# Patient Record
Sex: MALE | Race: WHITE | NOT HISPANIC OR LATINO | Employment: OTHER | URBAN - NONMETROPOLITAN AREA
[De-identification: names, ages, dates, MRNs, and addresses within clinical notes are randomized per-mention and may not be internally consistent; named-entity substitution may affect disease eponyms.]

---

## 2021-04-08 DIAGNOSIS — Z23 ENCOUNTER FOR IMMUNIZATION: ICD-10-CM

## 2023-05-09 ENCOUNTER — HOSPITAL ENCOUNTER (OUTPATIENT)
Dept: NON INVASIVE DIAGNOSTICS | Facility: HOSPITAL | Age: 78
Discharge: HOME | End: 2023-05-09

## 2023-05-09 ENCOUNTER — HOSPITAL ENCOUNTER (OUTPATIENT)
Dept: NON INVASIVE DIAGNOSTICS | Facility: HOSPITAL | Age: 78
Discharge: HOME | End: 2023-05-09
Attending: SURGERY

## 2023-05-09 DIAGNOSIS — I71.40 ABDOMINAL AORTIC ANEURYSM (AAA) WITHOUT RUPTURE (HCC): ICD-10-CM

## 2023-05-09 DIAGNOSIS — I65.23 CAROTID STENOSIS, ASYMPTOMATIC, BILATERAL: ICD-10-CM

## 2023-09-11 ENCOUNTER — OFFICE VISIT (OUTPATIENT)
Dept: URGENT CARE | Facility: CLINIC | Age: 78
End: 2023-09-11
Payer: COMMERCIAL

## 2023-09-11 VITALS
RESPIRATION RATE: 22 BRPM | TEMPERATURE: 97.6 F | OXYGEN SATURATION: 94 % | DIASTOLIC BLOOD PRESSURE: 62 MMHG | SYSTOLIC BLOOD PRESSURE: 132 MMHG | HEART RATE: 98 BPM | WEIGHT: 204 LBS

## 2023-09-11 DIAGNOSIS — M54.41 ACUTE RIGHT-SIDED LOW BACK PAIN WITH RIGHT-SIDED SCIATICA: Primary | ICD-10-CM

## 2023-09-11 PROCEDURE — 99213 OFFICE O/P EST LOW 20 MIN: CPT

## 2023-09-11 PROCEDURE — S9088 SERVICES PROVIDED IN URGENT: HCPCS

## 2023-09-11 RX ORDER — GABAPENTIN 100 MG/1
100 CAPSULE ORAL 2 TIMES DAILY
COMMUNITY
Start: 2023-06-19

## 2023-09-11 RX ORDER — AMLODIPINE BESYLATE 5 MG/1
5 TABLET ORAL DAILY
COMMUNITY
Start: 2023-06-25

## 2023-09-11 RX ORDER — ERGOCALCIFEROL 1.25 MG/1
CAPSULE ORAL
COMMUNITY
Start: 2023-06-25

## 2023-09-11 RX ORDER — ASPIRIN 81 MG/1
81 TABLET, CHEWABLE ORAL DAILY
COMMUNITY

## 2023-09-11 RX ORDER — TAMSULOSIN HYDROCHLORIDE 0.4 MG/1
0.4 CAPSULE ORAL DAILY
COMMUNITY
Start: 2023-06-20

## 2023-09-11 RX ORDER — PRAVASTATIN SODIUM 40 MG
TABLET ORAL
COMMUNITY
Start: 2023-08-29

## 2023-09-11 NOTE — PROGRESS NOTES
[facility]        NAME: Genie Fuentes is a 70 y.o. male  : 1945    MRN: [id number]  DATE: 2023  TIME: 6:43 PM    Assessment and Plan   Acute right-sided low back pain with right-sided sciatica [M54.41]  1. Acute right-sided low back pain with right-sided sciatica          Based on patient's medical history, will defer muscle relaxants and steroids at this time. Will start with conservative management at this time and if symptoms do not improve follow up with PCP or if symptoms worsen go to the ER. Upon arrival his SpO2 was 76% but he was asymptomatic and did not feel SOB. He stated he is on oxygen 2L at nighttime but not during the day. After being placed on 2L O2 in the office, he went up to 94% and was stable. Patient Instructions     Can apply Voltaren gel as needed  Take Tylenol for pain  Alternate heat and ice as needed  Follow up with PCP in 3-5 days. Proceed to  ER if symptoms worsen. Chief Complaint     Chief Complaint   Patient presents with   • Back Pain     C/o lower back pain; denies any injury/trauma         History of Present Illness       Back Pain  This is a new problem. Episode onset: 3 weeks. The pain is present in the lumbar spine (right sided but goes across back). Radiates to: right leg. Associated symptoms include weakness. Pertinent negatives include no bladder incontinence, bowel incontinence, chest pain, numbness, paresthesias or tingling. He has tried nothing for the symptoms. he denies any previous back problems/history. Review of Systems   Review of Systems   Respiratory: Negative for chest tightness and shortness of breath. Cardiovascular: Negative for chest pain and palpitations. Gastrointestinal: Negative for bowel incontinence. Genitourinary: Negative. Negative for bladder incontinence. Musculoskeletal: Positive for back pain and gait problem. Negative for neck pain and neck stiffness.    Neurological: Positive for weakness. Negative for tingling, numbness and paresthesias. Current Medications       Current Outpatient Medications:   •  amLODIPine (NORVASC) 5 mg tablet, Take 5 mg by mouth daily, Disp: , Rfl:   •  aspirin 81 mg chewable tablet, Chew 81 mg daily, Disp: , Rfl:   •  ergocalciferol (VITAMIN D2) 50,000 units, TAKE ONE TABLET BY MOUTH ONE TIME A MONTH, Disp: , Rfl:   •  gabapentin (NEURONTIN) 100 mg capsule, Take 100 mg by mouth 2 (two) times a day, Disp: , Rfl:   •  metFORMIN (GLUCOPHAGE) 500 mg tablet, Take 500 mg by mouth daily, Disp: , Rfl:   •  pravastatin (PRAVACHOL) 40 mg tablet, , Disp: , Rfl:   •  tamsulosin (FLOMAX) 0.4 mg, Take 0.4 mg by mouth daily, Disp: , Rfl:     Current Allergies     Allergies as of 09/11/2023   • (No Known Allergies)            The following portions of the patient's history were reviewed and updated as appropriate: allergies, current medications, past family history, past medical history, past social history, past surgical history and problem list.     Past Medical History:   Diagnosis Date   • COPD (chronic obstructive pulmonary disease) ([address])    • Diabetes mellitus ([address])    • Hyperlipidemia    • Hypertension        Past Surgical History:   Procedure Laterality Date   • CHOLECYSTECTOMY     • CORONARY ARTERY BYPASS GRAFT     • HERNIA REPAIR         History reviewed. No pertinent family history. Medications have been verified. Objective   /62   Pulse 98   Temp 97.6 °F (36.4 °C)   Resp 22   Wt 92.5 kg (204 lb)   SpO2 94% Comment: 2 L NC; denies SOB, no s/s of respiratory distress noted at this time       Physical Exam     Physical Exam  Constitutional:       Appearance: Normal appearance. HENT:      Mouth/Throat:      Mouth: Mucous membranes are moist.      Pharynx: Oropharynx is clear. Cardiovascular:      Rate and Rhythm: Normal rate and regular rhythm. Pulses: Normal pulses. Heart sounds: Normal heart sounds.    Pulmonary:      Effort: Pulmonary effort is normal.      Breath sounds: Normal breath sounds. Musculoskeletal:         General: Tenderness (right lumbar paraspinal) present. No swelling or deformity. Normal range of motion. Skin:     General: Skin is warm. Neurological:      General: No focal deficit present. Mental Status: He is alert and oriented to person, place, and time. Mental status is at baseline.

## 2023-09-11 NOTE — PATIENT INSTRUCTIONS
Can apply Voltaren gel as needed  Take Tylenol for pain  Alternate heat and ice as needed  Follow up with PCP in 3-5 days. Proceed to  ER if symptoms worsen.

## 2023-10-20 ENCOUNTER — HOSPITAL ENCOUNTER (EMERGENCY)
Facility: HOSPITAL | Age: 78
Discharge: HOME | End: 2023-10-20
Attending: EMERGENCY MEDICINE
Payer: COMMERCIAL

## 2023-10-20 ENCOUNTER — APPOINTMENT (EMERGENCY)
Dept: CT IMAGING | Facility: HOSPITAL | Age: 78
End: 2023-10-20
Payer: COMMERCIAL

## 2023-10-20 ENCOUNTER — OFFICE VISIT (OUTPATIENT)
Dept: URGENT CARE | Facility: CLINIC | Age: 78
End: 2023-10-20
Payer: COMMERCIAL

## 2023-10-20 VITALS
HEIGHT: 67 IN | HEART RATE: 99 BPM | DIASTOLIC BLOOD PRESSURE: 69 MMHG | SYSTOLIC BLOOD PRESSURE: 147 MMHG | WEIGHT: 205 LBS | TEMPERATURE: 98.6 F | BODY MASS INDEX: 32.18 KG/M2 | RESPIRATION RATE: 16 BRPM | OXYGEN SATURATION: 95 %

## 2023-10-20 VITALS
SYSTOLIC BLOOD PRESSURE: 112 MMHG | OXYGEN SATURATION: 91 % | RESPIRATION RATE: 18 BRPM | DIASTOLIC BLOOD PRESSURE: 68 MMHG | HEART RATE: 94 BPM | TEMPERATURE: 98.2 F

## 2023-10-20 DIAGNOSIS — N30.00 ACUTE CYSTITIS WITHOUT HEMATURIA: Primary | ICD-10-CM

## 2023-10-20 DIAGNOSIS — N39.0 URINARY TRACT INFECTION: Primary | ICD-10-CM

## 2023-10-20 LAB
ALBUMIN SERPL BCP-MCNC: 4 G/DL (ref 3.5–5)
ALP SERPL-CCNC: 59 U/L (ref 34–104)
ALT SERPL W P-5'-P-CCNC: 7 U/L (ref 7–52)
ANION GAP SERPL CALCULATED.3IONS-SCNC: 7 MMOL/L
AST SERPL W P-5'-P-CCNC: 10 U/L (ref 13–39)
BACTERIA UR QL AUTO: ABNORMAL /HPF
BASOPHILS # BLD AUTO: 0.03 THOUSANDS/ÂΜL (ref 0–0.1)
BASOPHILS NFR BLD AUTO: 0 % (ref 0–1)
BILIRUB SERPL-MCNC: 0.47 MG/DL (ref 0.2–1)
BILIRUB UR QL STRIP: ABNORMAL
BUN SERPL-MCNC: 20 MG/DL (ref 5–25)
CALCIUM SERPL-MCNC: 9 MG/DL (ref 8.4–10.2)
CHLORIDE SERPL-SCNC: 98 MMOL/L (ref 96–108)
CLARITY UR: ABNORMAL
CO2 SERPL-SCNC: 28 MMOL/L (ref 21–32)
COLOR UR: YELLOW
CREAT SERPL-MCNC: 1.05 MG/DL (ref 0.6–1.3)
EOSINOPHIL # BLD AUTO: 0.12 THOUSAND/ÂΜL (ref 0–0.61)
EOSINOPHIL NFR BLD AUTO: 1 % (ref 0–6)
ERYTHROCYTE [DISTWIDTH] IN BLOOD BY AUTOMATED COUNT: 13.4 % (ref 11.6–15.1)
GFR SERPL CREATININE-BSD FRML MDRD: 68 ML/MIN/1.73SQ M
GLUCOSE SERPL-MCNC: 136 MG/DL (ref 65–140)
GLUCOSE UR STRIP-MCNC: NEGATIVE MG/DL
HCT VFR BLD AUTO: 42 % (ref 36.5–49.3)
HGB BLD-MCNC: 13.1 G/DL (ref 12–17)
HGB UR QL STRIP.AUTO: ABNORMAL
IMM GRANULOCYTES # BLD AUTO: 0.11 THOUSAND/UL (ref 0–0.2)
IMM GRANULOCYTES NFR BLD AUTO: 1 % (ref 0–2)
KETONES UR STRIP-MCNC: ABNORMAL MG/DL
LEUKOCYTE ESTERASE UR QL STRIP: ABNORMAL
LYMPHOCYTES # BLD AUTO: 1.51 THOUSANDS/ÂΜL (ref 0.6–4.47)
LYMPHOCYTES NFR BLD AUTO: 8 % (ref 14–44)
MCH RBC QN AUTO: 29.4 PG (ref 26.8–34.3)
MCHC RBC AUTO-ENTMCNC: 31.2 G/DL (ref 31.4–37.4)
MCV RBC AUTO: 94 FL (ref 82–98)
MONOCYTES # BLD AUTO: 1.51 THOUSAND/ÂΜL (ref 0.17–1.22)
MONOCYTES NFR BLD AUTO: 8 % (ref 4–12)
NEUTROPHILS # BLD AUTO: 16.88 THOUSANDS/ÂΜL (ref 1.85–7.62)
NEUTS SEG NFR BLD AUTO: 82 % (ref 43–75)
NITRITE UR QL STRIP: POSITIVE
NON-SQ EPI CELLS URNS QL MICRO: ABNORMAL /HPF
NRBC BLD AUTO-RTO: 0 /100 WBCS
PH UR STRIP.AUTO: 7.5 [PH]
PLATELET # BLD AUTO: 205 THOUSANDS/UL (ref 149–390)
PMV BLD AUTO: 9.8 FL (ref 8.9–12.7)
POTASSIUM SERPL-SCNC: 3.7 MMOL/L (ref 3.5–5.3)
PROT SERPL-MCNC: 7.5 G/DL (ref 6.4–8.4)
PROT UR STRIP-MCNC: ABNORMAL MG/DL
RBC # BLD AUTO: 4.45 MILLION/UL (ref 3.88–5.62)
RBC #/AREA URNS AUTO: ABNORMAL /HPF
SL AMB  POCT GLUCOSE, UA: NEGATIVE
SL AMB LEUKOCYTE ESTERASE,UA: ABNORMAL
SL AMB POCT BILIRUBIN,UA: NEGATIVE
SL AMB POCT BLOOD,UA: ABNORMAL
SL AMB POCT CLARITY,UA: ABNORMAL
SL AMB POCT COLOR,UA: YELLOW
SL AMB POCT KETONES,UA: NEGATIVE
SL AMB POCT NITRITE,UA: POSITIVE
SL AMB POCT PH,UA: 7.5
SL AMB POCT SPECIFIC GRAVITY,UA: 1.01
SL AMB POCT URINE PROTEIN: ABNORMAL
SL AMB POCT UROBILINOGEN: ABNORMAL
SODIUM SERPL-SCNC: 133 MMOL/L (ref 135–147)
SP GR UR STRIP.AUTO: 1.02 (ref 1–1.03)
UROBILINOGEN UR QL STRIP.AUTO: 4 E.U./DL
WBC # BLD AUTO: 20.16 THOUSAND/UL (ref 4.31–10.16)
WBC #/AREA URNS AUTO: ABNORMAL /HPF

## 2023-10-20 PROCEDURE — 81002 URINALYSIS NONAUTO W/O SCOPE: CPT

## 2023-10-20 PROCEDURE — G1004 CDSM NDSC: HCPCS

## 2023-10-20 PROCEDURE — 81001 URINALYSIS AUTO W/SCOPE: CPT | Performed by: EMERGENCY MEDICINE

## 2023-10-20 PROCEDURE — 36415 COLL VENOUS BLD VENIPUNCTURE: CPT | Performed by: EMERGENCY MEDICINE

## 2023-10-20 PROCEDURE — 74176 CT ABD & PELVIS W/O CONTRAST: CPT

## 2023-10-20 PROCEDURE — S9088 SERVICES PROVIDED IN URGENT: HCPCS

## 2023-10-20 PROCEDURE — 85025 COMPLETE CBC W/AUTO DIFF WBC: CPT | Performed by: EMERGENCY MEDICINE

## 2023-10-20 PROCEDURE — 80053 COMPREHEN METABOLIC PANEL: CPT | Performed by: EMERGENCY MEDICINE

## 2023-10-20 PROCEDURE — 87147 CULTURE TYPE IMMUNOLOGIC: CPT | Performed by: EMERGENCY MEDICINE

## 2023-10-20 PROCEDURE — 87086 URINE CULTURE/COLONY COUNT: CPT | Performed by: EMERGENCY MEDICINE

## 2023-10-20 PROCEDURE — 99213 OFFICE O/P EST LOW 20 MIN: CPT

## 2023-10-20 RX ORDER — PHENAZOPYRIDINE HYDROCHLORIDE 200 MG/1
200 TABLET, FILM COATED ORAL 3 TIMES DAILY
Qty: 6 TABLET | Refills: 0 | Status: SHIPPED | OUTPATIENT
Start: 2023-10-20

## 2023-10-20 RX ORDER — CEFTRIAXONE 1 G/50ML
1000 INJECTION, SOLUTION INTRAVENOUS ONCE
Status: COMPLETED | OUTPATIENT
Start: 2023-10-20 | End: 2023-10-20

## 2023-10-20 RX ORDER — CIPROFLOXACIN 500 MG/1
500 TABLET, FILM COATED ORAL EVERY 12 HOURS SCHEDULED
Qty: 28 TABLET | Refills: 0 | Status: SHIPPED | OUTPATIENT
Start: 2023-10-20 | End: 2023-11-03

## 2023-10-20 RX ORDER — CEPHALEXIN 500 MG/1
500 CAPSULE ORAL EVERY 12 HOURS SCHEDULED
Qty: 20 CAPSULE | Refills: 0 | Status: SHIPPED | OUTPATIENT
Start: 2023-10-20 | End: 2023-10-30

## 2023-10-20 RX ORDER — PHENAZOPYRIDINE HYDROCHLORIDE 100 MG/1
100 TABLET, FILM COATED ORAL ONCE
Status: COMPLETED | OUTPATIENT
Start: 2023-10-20 | End: 2023-10-20

## 2023-10-20 RX ADMIN — CEFTRIAXONE 1000 MG: 1 INJECTION, SOLUTION INTRAVENOUS at 21:00

## 2023-10-20 RX ADMIN — PHENAZOPYRIDINE 100 MG: 100 TABLET ORAL at 21:41

## 2023-10-20 NOTE — PATIENT INSTRUCTIONS
Take antibiotic as prescribed. Complete full dose of antibiotics even if symptoms begin to improve or resolve. Proper hygiene. Be sure to wipe from front to back. Do not wear restrictive clothing. Avoid scented bubble baths. May use OTC Tylenol for fever. DRINK LOTS OF FLUIDS. Observe for signs of worsening infection including increased pain, discharge, blood in the urine, back or flank pain, fever or chills, or persistent symptoms. Your symptoms should begin to improve over the next couple days.

## 2023-10-20 NOTE — ED NOTES
Patient arrived to ED w/o oxygen tank; states he chronically uses 2L NC at home. Oxygen saturation 78% on RA. Patient placed on NC upon arrival into room.      Leora Trimble, [address]  10/20/23 1913

## 2023-10-20 NOTE — ED PROVIDER NOTES
History  Chief Complaint   Patient presents with    Difficulty Urinating     Tuesday start w/ difficulty urinating     Patient is a 68-year-old male presenting to the emergency department complaining of difficulty urinating for the past 2 to 3 days, patient reports increased urinary frequency but only a few drops come out at a time, he is noted some blood in his urine today as well, denies any fevers, no nausea, vomiting or diarrhea, no history of trauma, no history of kidney stones, he is also having some discomfort in his right flank        Prior to Admission Medications   Prescriptions Last Dose Informant Patient Reported? Taking? amLODIPine (NORVASC) 5 mg tablet   Yes No   Sig: Take 5 mg by mouth daily   aspirin 81 mg chewable tablet   Yes No   Sig: Chew 81 mg daily   ciprofloxacin (CIPRO) 500 mg tablet   No No   Sig: Take 1 tablet (500 mg total) by mouth every 12 (twelve) hours for 14 days   ergocalciferol (VITAMIN D2) 50,000 units   Yes No   Sig: TAKE ONE TABLET BY MOUTH ONE TIME A MONTH   gabapentin (NEURONTIN) 100 mg capsule   Yes No   Sig: Take 100 mg by mouth 2 (two) times a day   metFORMIN (GLUCOPHAGE) 500 mg tablet   Yes No   Sig: Take 500 mg by mouth daily   pravastatin (PRAVACHOL) 40 mg tablet   Yes No   tamsulosin (FLOMAX) 0.4 mg   Yes No   Sig: Take 0.4 mg by mouth daily      Facility-Administered Medications: None       Past Medical History:   Diagnosis Date    COPD (chronic obstructive pulmonary disease) (HCC)     Diabetes mellitus ([address])     Hyperlipidemia     Hypertension        Past Surgical History:   Procedure Laterality Date    CHOLECYSTECTOMY      CORONARY ARTERY BYPASS GRAFT      HERNIA REPAIR         History reviewed. No pertinent family history. I have reviewed and agree with the history as documented.     E-Cigarette/Vaping    E-Cigarette Use Never User      E-Cigarette/Vaping Substances     Social History     Tobacco Use    Smoking status: Every Day     Packs/day: 1.00     Types: Cigarettes    Smokeless tobacco: Never   Vaping Use    Vaping Use: Never used   Substance Use Topics    Alcohol use: Yes     Comment: Socially    Drug use: Never       Review of Systems   Constitutional: Negative. HENT: Negative. Eyes: Negative. Respiratory: Negative. Cardiovascular: Negative. Gastrointestinal: Negative. Endocrine: Negative. Genitourinary:  Positive for dysuria, flank pain and hematuria. Skin: Negative. Allergic/Immunologic: Negative. Neurological: Negative. Hematological: Negative. Psychiatric/Behavioral: Negative. Physical Exam  Physical Exam  Constitutional:       Appearance: He is well-developed. HENT:      Head: Normocephalic and atraumatic. Nose: Nose normal.      Mouth/Throat:      Mouth: Mucous membranes are moist.   Eyes:      Conjunctiva/sclera: Conjunctivae normal.      Pupils: Pupils are equal, round, and reactive to light. Cardiovascular:      Rate and Rhythm: Normal rate and regular rhythm. Pulmonary:      Effort: Pulmonary effort is normal.   Abdominal:      General: Abdomen is flat. Palpations: Abdomen is soft. Tenderness: There is no abdominal tenderness. There is no right CVA tenderness or left CVA tenderness. Musculoskeletal:         General: Normal range of motion. Cervical back: Normal range of motion and neck supple. Skin:     General: Skin is warm and dry. Neurological:      Mental Status: He is alert and oriented to person, place, and time.          Vital Signs  ED Triage Vitals   Temperature Pulse Respirations Blood Pressure SpO2   10/20/23 1911 10/20/23 1911 10/20/23 1911 10/20/23 1913 10/20/23 1911   98.6 °F (37 °C) (!) 120 20 147/69 (S) (!) 78 %      Temp Source Heart Rate Source Patient Position - Orthostatic VS BP Location FiO2 (%)   10/20/23 1911 10/20/23 1911 -- -- --   Tympanic Monitor         Pain Score       10/20/23 1913       5           Vitals:    10/20/23 1911 10/20/23 1913 10/20/23 1914 10/20/23 1915   BP:  147/69  147/69   Pulse: (!) 120  97 99         Visual Acuity      ED Medications  Medications   phenazopyridine (PYRIDIUM) tablet 100 mg (has no administration in time range)   cefTRIAXone (ROCEPHIN) IVPB (premix in dextrose) 1,000 mg 50 mL (0 mg Intravenous Stopped 10/20/23 2132)       Diagnostic Studies  Results Reviewed       Procedure Component Value Units Date/Time    Urine Microscopic [[id number]]  (Abnormal) Collected: 10/20/23 1948    Lab Status: Final result Specimen: Urine, Clean Catch Updated: 10/20/23 2029     RBC, UA Innumerable /hpf      WBC, UA Innumerable /hpf      Epithelial Cells       Field obscured, unable to enumerate     /hpf     Bacteria, UA Innumerable /hpf     Urine culture [[id number]] Collected: 10/20/23 1948    Lab Status:  In process Specimen: Urine, Clean Catch Updated: 10/20/23 2029    UA w Reflex to Microscopic w Reflex to Culture [[id number]]  (Abnormal) Collected: 10/20/23 1948    Lab Status: Final result Specimen: Urine, Clean Catch Updated: 10/20/23 2023     Color, UA Yellow     Clarity, UA Turbid     Specific Gravity, UA 1.020     pH, UA 7.5     Leukocytes, UA Large     Nitrite, UA Positive     Protein,  (2+) mg/dl      Glucose, UA Negative mg/dl      Ketones, UA Trace mg/dl      Urobilinogen, UA 4.0 E.U./dl      Bilirubin, UA Small     Occult Blood, UA Large    Comprehensive metabolic panel [[id number]]  (Abnormal) Collected: 10/20/23 1948    Lab Status: Final result Specimen: Blood from Arm, Left Updated: 10/20/23 2010     Sodium 133 mmol/L      Potassium 3.7 mmol/L      Chloride 98 mmol/L      CO2 28 mmol/L      ANION GAP 7 mmol/L      BUN 20 mg/dL      Creatinine 1.05 mg/dL      Glucose 136 mg/dL      Calcium 9.0 mg/dL      AST 10 U/L      ALT 7 U/L      Alkaline Phosphatase 59 U/L      Total Protein 7.5 g/dL      Albumin 4.0 g/dL      Total Bilirubin 0.47 mg/dL      eGFR 68 ml/min/1.73sq m     Narrative:      [facility] guidelines for Chronic Kidney Disease (CKD):     Stage 1 with normal or high GFR (GFR > 90 mL/min/1.73 square meters)    Stage 2 Mild CKD (GFR = 60-89 mL/min/1.73 square meters)    Stage 3A Moderate CKD (GFR = 45-59 mL/min/1.73 square meters)    Stage 3B Moderate CKD (GFR = 30-44 mL/min/1.73 square meters)    Stage 4 Severe CKD (GFR = 15-29 mL/min/1.73 square meters)    Stage 5 End Stage CKD (GFR <15 mL/min/1.73 square meters)  Note: GFR calculation is accurate only with a steady state creatinine    CBC and differential [[id number]]  (Abnormal) Collected: 10/20/23 1948    Lab Status: Final result Specimen: Blood from Arm, Left Updated: 10/20/23 1952     WBC 20.16 Thousand/uL      RBC 4.45 Million/uL      Hemoglobin 13.1 g/dL      Hematocrit 42.0 %      MCV 94 fL      MCH 29.4 pg      MCHC 31.2 g/dL      RDW 13.4 %      MPV 9.8 fL      Platelets 562 Thousands/uL      nRBC 0 /100 WBCs      Neutrophils Relative 82 %      Immat GRANS % 1 %      Lymphocytes Relative 8 %      Monocytes Relative 8 %      Eosinophils Relative 1 %      Basophils Relative 0 %      Neutrophils Absolute 16.88 Thousands/µL      Immature Grans Absolute 0.11 Thousand/uL      Lymphocytes Absolute 1.51 Thousands/µL      Monocytes Absolute 1.51 Thousand/µL      Eosinophils Absolute 0.12 Thousand/µL      Basophils Absolute 0.03 Thousands/µL                    CT renal stone study abdomen pelvis without contrast   Final Result by Juan Luis Martínez MD (10/20 2129)      1. No renal system stone   2.   Posterior bladder wall thickening, differential includes cystitis, chronic bladder outlet obstruction, as well as neoplasm, recommend nonemergent CT urogram               Workstation performed: [id number]                    Procedures  Procedures         ED Course  ED Course as of 10/20/23 2138   [facility] Oct 20, 2023   2138 Urine Microscopic(!)   2138 UA w Reflex to Microscopic w Reflex to Culture(!)   2138 Comprehensive metabolic panel(!)   9515 CBC and differential(!)   2138 CT renal stone study abdomen pelvis without contrast                               SBIRT 22yo+      Flowsheet Row Most Recent Value   Initial Alcohol Screen: US AUDIT-C     1. How often do you have a drink containing alcohol? 0 Filed at: 10/20/2023 1911   2. How many drinks containing alcohol do you have on a typical day you are drinking? 0 Filed at: 10/20/2023 1911   3a. Male UNDER 65: How often do you have five or more drinks on one occasion? 0 Filed at: 10/20/2023 1911   3b. FEMALE Any Age, or MALE 65+: How often do you have 4 or more drinks on one occassion? 0 Filed at: 10/20/2023 1911   Audit-C Score 0 Filed at: 10/20/2023 1911   ISABEL: How many times in the past year have you. .. Used an illegal drug or used a prescription medication for non-medical reasons? Never Filed at: 10/20/2023 1911                      Medical Decision Making  Patient presents for symptoms consistent with acute complicated urinary tract infection. No systemic symptoms, no fever, vital signs are stable. Patient is well-appearing and nonseptic appearing. Low suspicion for acute pyelonephritis given lack of fever, CVA tenderness or systemic features. Low suspicion for kidney stone or infected stone. Low suspicion for ovarian torsion, PID or appendicitis. Patient will be started on oral antibiotics for treatment of urinary tract infection, advise close follow-up with PCP or return if symptoms worsen      Problems Addressed:  Urinary tract infection: acute illness or injury    Amount and/or Complexity of Data Reviewed  Labs: ordered. Decision-making details documented in ED Course. Radiology: ordered. Decision-making details documented in ED Course. Risk  OTC drugs. Prescription drug management.              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                    Follow-up Information       Follow up With Specialties Details Why Contact Info    Angela Garnica MD Urology In 1 week  [address]  [place] [address]  [phone]              Patient's Medications   Discharge Prescriptions    CEPHALEXIN (KEFLEX) 500 MG CAPSULE    Take 1 capsule (500 mg total) by mouth every 12 (twelve) hours for 10 days       Start Date: 10/20/2023End Date: 10/30/2023       Order Dose: 500 mg       Quantity: 20 capsule    Refills: 0    PHENAZOPYRIDINE (PYRIDIUM) 200 MG TABLET    Take 1 tablet (200 mg total) by mouth 3 (three) times a day       Start Date: 10/20/2023End Date: --       Order Dose: 200 mg       Quantity: 6 tablet    Refills: 0           PDMP Review       None            ED Provider  Electronically Signed by             Monserrat Llanes DO  10/20/23 2138

## 2023-10-20 NOTE — PROGRESS NOTES
[facility]        NAME: Namrata Irvin is a 70 y.o. male  : 1945    MRN: [id number]  DATE: 2023  TIME: 11:41 AM    Assessment and Plan   Acute cystitis without hematuria [N30.00]  1. Acute cystitis without hematuria  POCT urine dip    ciprofloxacin (CIPRO) 500 mg tablet    CANCELED: Urine culture        Discussed problem with patient. Urine culture unable to be obtained due to small volume of sample. Urine dip revealed moderate leukocytes with nitrates with moderate blood. Suspicious of UTI. However patient is having difficulty urinating, stating he has had small-volume urines for the past few days since his symptom onset. Discussed this with patient and discussed outpatient management by trialing antibiotic or reporting to the ER for further management. Opting to trial antibiotic and will monitor for worsening symptoms at home. Red flag symptoms were discussed. Report to the ER starts experiencing. Patient Instructions       Follow up with PCP in 3-5 days. Proceed to  ER if symptoms worsen. Chief Complaint     Chief Complaint   Patient presents with   • Possible UTI     C/o bladder discomfort, lower back pain, and dysuria since Tuesday         History of Present Illness       C/o bladder discomfort, lower back pain, and dysuria since Tuesday. Denies any fevers or chills and has not been using anything for symptoms. Also reports difficulty urinating, stating he has had small-volume voids and states he has to go to the bathroom every 5 to 6 minutes. Denies any blood in urine. Eating and drinking normally. Review of Systems   Review of Systems   Constitutional:  Negative for appetite change, chills, fatigue and fever. Respiratory:  Negative for cough, shortness of breath, wheezing and stridor. Cardiovascular:  Negative for chest pain and palpitations. Gastrointestinal:  Negative for nausea.    Genitourinary:  Positive for difficulty urinating (small voiding), dysuria, flank pain, frequency and urgency. Negative for hematuria. Current Medications       Current Outpatient Medications:   •  ciprofloxacin (CIPRO) 500 mg tablet, Take 1 tablet (500 mg total) by mouth every 12 (twelve) hours for 14 days, Disp: 28 tablet, Rfl: 0  •  amLODIPine (NORVASC) 5 mg tablet, Take 5 mg by mouth daily, Disp: , Rfl:   •  aspirin 81 mg chewable tablet, Chew 81 mg daily, Disp: , Rfl:   •  ergocalciferol (VITAMIN D2) 50,000 units, TAKE ONE TABLET BY MOUTH ONE TIME A MONTH, Disp: , Rfl:   •  gabapentin (NEURONTIN) 100 mg capsule, Take 100 mg by mouth 2 (two) times a day, Disp: , Rfl:   •  metFORMIN (GLUCOPHAGE) 500 mg tablet, Take 500 mg by mouth daily, Disp: , Rfl:   •  pravastatin (PRAVACHOL) 40 mg tablet, , Disp: , Rfl:   •  tamsulosin (FLOMAX) 0.4 mg, Take 0.4 mg by mouth daily, Disp: , Rfl:     Current Allergies     Allergies as of 10/20/2023   • (No Known Allergies)            The following portions of the patient's history were reviewed and updated as appropriate: allergies, current medications, past family history, past medical history, past social history, past surgical history and problem list.     Past Medical History:   Diagnosis Date   • COPD (chronic obstructive pulmonary disease) ([address]    • Diabetes mellitus ([address])    • Hyperlipidemia    • Hypertension        Past Surgical History:   Procedure Laterality Date   • CHOLECYSTECTOMY     • CORONARY ARTERY BYPASS GRAFT     • HERNIA REPAIR         History reviewed. No pertinent family history. Medications have been verified. Objective   /68   Pulse 94   Temp 98.2 °F (36.8 °C)   Resp 18   SpO2 91%        Physical Exam     Physical Exam  Vitals and nursing note reviewed. Constitutional:       General: He is not in acute distress. Appearance: Normal appearance. He is normal weight. He is not ill-appearing, toxic-appearing or diaphoretic. HENT:      Head: Normocephalic.    Cardiovascular: Rate and Rhythm: Normal rate and regular rhythm. Pulses: Normal pulses. Heart sounds: Normal heart sounds. No murmur heard. No friction rub. No gallop. Pulmonary:      Effort: Pulmonary effort is normal. No respiratory distress. Breath sounds: Normal breath sounds. No stridor. No wheezing, rhonchi or rales. Chest:      Chest wall: No tenderness. Abdominal:      General: Abdomen is flat. Bowel sounds are normal. There is no distension. Palpations: Abdomen is soft. There is no mass. Tenderness: There is no abdominal tenderness. There is no right CVA tenderness, left CVA tenderness, guarding or rebound. Hernia: No hernia is present. Neurological:      Mental Status: He is alert.

## 2023-10-22 LAB — BACTERIA UR CULT: ABNORMAL

## 2023-10-23 LAB — BACTERIA UR CULT: ABNORMAL

## 2023-11-26 PROBLEM — R10.9 ACUTE RIGHT FLANK PAIN: Status: ACTIVE | Noted: 2023-11-26

## 2023-11-26 PROBLEM — R35.0 URINARY FREQUENCY: Status: ACTIVE | Noted: 2023-11-26

## 2023-11-26 PROBLEM — R31.0 GROSS HEMATURIA: Status: ACTIVE | Noted: 2023-11-26

## 2023-11-26 PROBLEM — N32.89 BLADDER WALL THICKENING: Status: ACTIVE | Noted: 2023-11-26

## 2023-11-26 PROBLEM — N30.01 ACUTE CYSTITIS WITH HEMATURIA: Status: ACTIVE | Noted: 2023-11-26

## 2023-11-26 NOTE — PROGRESS NOTES
UROLOGY PROGRESS NOTE         NAME: Berta Marroquin  AGE: 70 y.o. SEX: male  : 1945   MRN: [id number]    DATE: 2023  TIME: 9:20 AM    Assessment and Plan   Procedures     Impression:   1. Acute cystitis with hematuria    2. Urinary frequency    3. Gross hematuria    4. Acute right flank pain    5. Bladder wall thickening    6. Urinary tract infection  -     Ambulatory Referral to Urology    7. Phimosis         Plan: Because of his significant history of smoking, gross hematuria, and occupation as a  I think follow-up cystoscopy would be appropriate to rule out any bladder pathology. Patient was unable to void today. For his phimosis he is not interested in a circumcision he is going to do his best to clean it with soap and water daily. He after the cystoscopy we can consider BPH type medications if the patient chooses. He and his daughter agree with this plan    Chief Complaint     Chief Complaint   Patient presents with    New Patient Visit     History of Present Illness     HPI: Berta Marroquin is a 70y.o. year old male who presents with follow-up UTIs. Patient was in the ER on 10/20/2023 and her urine culture grew out MRSA. Prescribed cephalexin. ER visit she had a CT stone protocol that showed normal upper tracts, however, there is posterior bladder wall thickening and recommend further evaluation with a cystoscopy. Her creatinine level was normal at 1.0. Her symptoms when she presented to the ER on 10/20/2023 she was complaining of difficulty urinating for 2 to 3 days with increased frequency. She also noted some hematuria. And some right flank pain. Patient was on Cipro when she presented to the ER. Also on Flomax. He was sent home on 10 days of Keflex and Pyridium. Patient states finished his antibiotics currently without any irritable or obstructive voiding complaints. Is a longtime smoker and worked as a .   Reviewed CAT scan showed the bladder wall thickening. His postvoid residual was 16 cc he was unable to void. The following portions of the patient's history were reviewed and updated as appropriate: allergies, current medications, past family history, past medical history, past social history, past surgical history and problem list.  Past Medical History:   Diagnosis Date    COPD (chronic obstructive pulmonary disease) ([address] )     Diabetes mellitus ([address] [facility])     Hyperlipidemia     Hypertension      Past Surgical History:   Procedure Laterality Date    CHOLECYSTECTOMY      CORONARY ARTERY BYPASS GRAFT      HERNIA REPAIR       shoulder  Review of Systems     Const: Denies chills, fever and weight loss. CV: Denies chest pain. Resp: Denies SOB. GI: Denies abdominal pain, nausea and vomiting. : Denies symptoms other than stated above. Musculo: Denies back pain. Objective   /83 (BP Location: Left arm, Patient Position: Sitting)   Pulse 88   Temp 97.9 °F (36.6 °C)   Wt 79.5 kg (175 lb 3.2 oz)   SpO2 91%   BMI 27.44 kg/m²     Physical Exam  Const: Appears healthy and well developed. No signs of acute distress present. Resp: Respirations are regular and unlabored. CV: Rate is regular. Rhythm is regular. Abdomen: Abdomen is soft, nontender, and nondistended. Kidneys are not palpable. : Phimotic foreskin testicular exam normal prostate enlarged smooth no masses or nodules  Psych: Patient's attitude is cooperative.  Mood is normal. Affect is normal.    Current Medications     Current Outpatient Medications:     amLODIPine (NORVASC) 5 mg tablet, Take 5 mg by mouth daily, Disp: , Rfl:     aspirin 81 mg chewable tablet, Chew 81 mg daily, Disp: , Rfl:     ergocalciferol (VITAMIN D2) 50,000 units, TAKE ONE TABLET BY MOUTH ONE TIME A MONTH, Disp: , Rfl:     gabapentin (NEURONTIN) 100 mg capsule, Take 100 mg by mouth 2 (two) times a day, Disp: , Rfl:     Lancets (OneTouch Delica Plus [id number]) MISC, USE 1 LANCET TO CHECK GLUCOSE TWICE DAILY, Disp: , Rfl:     metFORMIN (GLUCOPHAGE) 500 mg tablet, Take 500 mg by mouth daily, Disp: , Rfl:     OneTouch Ultra test strip, 2 (two) times a day Test blood sugar, Disp: , Rfl:     pravastatin (PRAVACHOL) 40 mg tablet, , Disp: , Rfl:     tamsulosin (FLOMAX) 0.4 mg, Take 0.4 mg by mouth daily, Disp: , Rfl:     phenazopyridine (PYRIDIUM) 200 mg tablet, Take 1 tablet (200 mg total) by mouth 3 (three) times a day (Patient not taking: Reported on 11/29/2023), Disp: 6 tablet, Rfl: 0        Fredy Isbell MD

## 2023-11-29 ENCOUNTER — OFFICE VISIT (OUTPATIENT)
Dept: UROLOGY | Facility: CLINIC | Age: 78
End: 2023-11-29
Payer: COMMERCIAL

## 2023-11-29 VITALS
OXYGEN SATURATION: 91 % | TEMPERATURE: 97.9 F | HEART RATE: 88 BPM | BODY MASS INDEX: 27.44 KG/M2 | DIASTOLIC BLOOD PRESSURE: 83 MMHG | WEIGHT: 175.2 LBS | SYSTOLIC BLOOD PRESSURE: 140 MMHG

## 2023-11-29 DIAGNOSIS — N30.01 ACUTE CYSTITIS WITH HEMATURIA: ICD-10-CM

## 2023-11-29 DIAGNOSIS — R10.9 ACUTE RIGHT FLANK PAIN: ICD-10-CM

## 2023-11-29 DIAGNOSIS — N39.0 URINARY TRACT INFECTION: Primary | ICD-10-CM

## 2023-11-29 DIAGNOSIS — N47.1 PHIMOSIS: ICD-10-CM

## 2023-11-29 DIAGNOSIS — R31.0 GROSS HEMATURIA: ICD-10-CM

## 2023-11-29 DIAGNOSIS — R35.0 URINARY FREQUENCY: ICD-10-CM

## 2023-11-29 DIAGNOSIS — N32.89 BLADDER WALL THICKENING: ICD-10-CM

## 2023-11-29 LAB — POST-VOID RESIDUAL VOLUME, ML POC: 16 ML

## 2023-11-29 PROCEDURE — 99204 OFFICE O/P NEW MOD 45 MIN: CPT | Performed by: UROLOGY

## 2023-11-29 PROCEDURE — 51798 US URINE CAPACITY MEASURE: CPT | Performed by: UROLOGY

## 2023-11-29 RX ORDER — BLOOD SUGAR DIAGNOSTIC
STRIP MISCELLANEOUS 2 TIMES DAILY
COMMUNITY
Start: 2023-10-31

## 2023-11-29 RX ORDER — LANCETS 30 GAUGE
EACH MISCELLANEOUS
COMMUNITY
Start: 2023-11-02

## 2023-11-29 NOTE — PROGRESS NOTES
UROLOGY PROGRESS NOTE         NAME: Rex Armas  AGE: 70 y.o. SEX: male  : 1945   MRN: [id number]    DATE: 2023  TIME: 1:53 PM    Assessment and Plan      Cystoscopy     Date/Time  2023 9:45 AM     Performed by  Carl Luciano MD   Authorized by  Carl Luciano MD         Procedure Details:  Procedure type: cystoscopy    Additional Procedure Details: Patient was placed supine on the table prepped and draped usual sterile fashion. Flexible cystoscopy was carried out. Patient had normal anterior posterior urethra minimally obstructing prostate and moderate to severe trabeculated bladder with multiple diverticuli no gross tumors were seen no acute cystitis all cleared up.  Impression:   1. Bladder wall thickening    2. Gross hematuria         Plan: Continue Flomax, follow-up in 6 months for RICK PSA and if things are stable then yearly. Will also do PVR at the next office visit. Chief Complaint   No chief complaint on file. History of Present Illness     HPI: Rex Armas is a 70y.o. year old male who presents with follow-up gross hematuria, UTI bladder wall thickening on CT. 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 Patient was last seen on 2023 doing well no urology complaints low PVR unable to void. Prostate exam was benign feeling and smooth with but it was enlarged. Patient here today to complete hematuria workup with a cystoscopy. Upper tracts were normal on CT.               The following portions of the patient's history were reviewed and updated as appropriate: allergies, current medications, past family history, past medical history, past social history, past surgical history and problem list.  Past Medical History:   Diagnosis Date    COPD (chronic obstructive pulmonary disease) ([address] [facility])     Diabetes mellitus ([address] [facility])     Hyperlipidemia     Hypertension      Past Surgical History:   Procedure Laterality Date CHOLECYSTECTOMY      CORONARY ARTERY BYPASS GRAFT      HERNIA REPAIR       shoulder  Review of Systems     Const: Denies chills, fever and weight loss. CV: Denies chest pain. Resp: Denies SOB. GI: Denies abdominal pain, nausea and vomiting. : Denies symptoms other than stated above. Musculo: Denies back pain. Objective   There were no vitals taken for this visit. Physical Exam  Const: Appears healthy and well developed. No signs of acute distress present. Resp: Respirations are regular and unlabored. CV: Rate is regular. Rhythm is regular. Abdomen: Abdomen is soft, nontender, and nondistended. Kidneys are not palpable. : nl  Psych: Patient's attitude is cooperative.  Mood is normal. Affect is normal.    Current Medications     Current Outpatient Medications:     amLODIPine (NORVASC) 5 mg tablet, Take 5 mg by mouth daily, Disp: , Rfl:     aspirin 81 mg chewable tablet, Chew 81 mg daily, Disp: , Rfl:     ergocalciferol (VITAMIN D2) 50,000 units, TAKE ONE TABLET BY MOUTH ONE TIME A MONTH, Disp: , Rfl:     gabapentin (NEURONTIN) 100 mg capsule, Take 100 mg by mouth 2 (two) times a day, Disp: , Rfl:     Lancets (OneTouch Delica Plus [id number]) MISC, USE 1 LANCET TO CHECK GLUCOSE TWICE DAILY, Disp: , Rfl:     metFORMIN (GLUCOPHAGE) 500 mg tablet, Take 500 mg by mouth daily, Disp: , Rfl:     OneTouch Ultra test strip, 2 (two) times a day Test blood sugar, Disp: , Rfl:     phenazopyridine (PYRIDIUM) 200 mg tablet, Take 1 tablet (200 mg total) by mouth 3 (three) times a day (Patient not taking: Reported on 11/29/2023), Disp: 6 tablet, Rfl: 0    pravastatin (PRAVACHOL) 40 mg tablet, , Disp: , Rfl:     tamsulosin (FLOMAX) 0.4 mg, Take 0.4 mg by mouth daily, Disp: , Rfl:         Genia Montez MD

## 2023-12-01 ENCOUNTER — PROCEDURE VISIT (OUTPATIENT)
Dept: UROLOGY | Facility: CLINIC | Age: 78
End: 2023-12-01
Payer: COMMERCIAL

## 2023-12-01 VITALS
TEMPERATURE: 97.7 F | WEIGHT: 176 LBS | SYSTOLIC BLOOD PRESSURE: 152 MMHG | HEART RATE: 91 BPM | OXYGEN SATURATION: 93 % | RESPIRATION RATE: 18 BRPM | DIASTOLIC BLOOD PRESSURE: 68 MMHG | HEIGHT: 67 IN | BODY MASS INDEX: 27.62 KG/M2

## 2023-12-01 DIAGNOSIS — N32.89 BLADDER WALL THICKENING: Primary | ICD-10-CM

## 2023-12-01 DIAGNOSIS — R31.0 GROSS HEMATURIA: ICD-10-CM

## 2023-12-01 PROCEDURE — 52000 CYSTOURETHROSCOPY: CPT | Performed by: UROLOGY

## 2024-01-25 PROBLEM — N30.01 ACUTE CYSTITIS WITH HEMATURIA: Status: RESOLVED | Noted: 2023-11-26 | Resolved: 2024-01-25

## 2024-05-27 PROBLEM — R35.1 BENIGN PROSTATIC HYPERPLASIA WITH NOCTURIA: Status: ACTIVE | Noted: 2024-05-27

## 2024-05-27 PROBLEM — N40.1 BENIGN PROSTATIC HYPERPLASIA WITH NOCTURIA: Status: ACTIVE | Noted: 2024-05-27

## 2024-05-27 PROBLEM — Z87.898 HISTORY OF GROSS HEMATURIA: Status: ACTIVE | Noted: 2024-05-27

## 2024-05-30 ENCOUNTER — TELEPHONE (OUTPATIENT)
Dept: UROLOGY | Facility: CLINIC | Age: 79
End: 2024-05-30

## 2024-05-30 NOTE — TELEPHONE ENCOUNTER
----- Message from Orion Wise MD sent at 5/27/2024  6:49 AM EDT -----  If possible, patient have a PSA prior to seeing him on June 5 thanks

## 2024-11-05 ENCOUNTER — APPOINTMENT (EMERGENCY)
Dept: CT IMAGING | Facility: HOSPITAL | Age: 79
End: 2024-11-05
Payer: COMMERCIAL

## 2024-11-05 ENCOUNTER — HOSPITAL ENCOUNTER (EMERGENCY)
Facility: HOSPITAL | Age: 79
Discharge: HOME | End: 2024-11-05
Attending: EMERGENCY MEDICINE | Admitting: EMERGENCY MEDICINE
Payer: COMMERCIAL

## 2024-11-05 VITALS
HEART RATE: 95 BPM | OXYGEN SATURATION: 94 % | TEMPERATURE: 97.5 F | DIASTOLIC BLOOD PRESSURE: 87 MMHG | SYSTOLIC BLOOD PRESSURE: 178 MMHG | RESPIRATION RATE: 18 BRPM

## 2024-11-05 DIAGNOSIS — K52.9 COLITIS: Primary | ICD-10-CM

## 2024-11-05 LAB
ABO GROUP BLD: NORMAL
ALBUMIN SERPL BCG-MCNC: 4.1 G/DL (ref 3.5–5)
ALP SERPL-CCNC: 70 U/L (ref 34–104)
ALT SERPL W P-5'-P-CCNC: 10 U/L (ref 7–52)
ANION GAP SERPL CALCULATED.3IONS-SCNC: 6 MMOL/L (ref 4–13)
APTT PPP: 33 SECONDS (ref 23–34)
AST SERPL W P-5'-P-CCNC: 13 U/L (ref 13–39)
BASOPHILS # BLD AUTO: 0.05 THOUSANDS/ΜL (ref 0–0.1)
BASOPHILS NFR BLD AUTO: 0 % (ref 0–1)
BILIRUB SERPL-MCNC: 0.28 MG/DL (ref 0.2–1)
BLD GP AB SCN SERPL QL: NEGATIVE
BUN SERPL-MCNC: 20 MG/DL (ref 5–25)
CALCIUM SERPL-MCNC: 8.8 MG/DL (ref 8.4–10.2)
CARDIAC TROPONIN I PNL SERPL HS: 8 NG/L
CHLORIDE SERPL-SCNC: 105 MMOL/L (ref 96–108)
CO2 SERPL-SCNC: 29 MMOL/L (ref 21–32)
CREAT SERPL-MCNC: 1.17 MG/DL (ref 0.6–1.3)
EOSINOPHIL # BLD AUTO: 0.72 THOUSAND/ΜL (ref 0–0.61)
EOSINOPHIL NFR BLD AUTO: 6 % (ref 0–6)
ERYTHROCYTE [DISTWIDTH] IN BLOOD BY AUTOMATED COUNT: 13.2 % (ref 11.6–15.1)
GFR SERPL CREATININE-BSD FRML MDRD: 59 ML/MIN/1.73SQ M
GLUCOSE SERPL-MCNC: 108 MG/DL (ref 65–140)
HCT VFR BLD AUTO: 39.8 % (ref 36.5–49.3)
HGB BLD-MCNC: 12.4 G/DL (ref 12–17)
IMM GRANULOCYTES # BLD AUTO: 0.06 THOUSAND/UL (ref 0–0.2)
IMM GRANULOCYTES NFR BLD AUTO: 1 % (ref 0–2)
INR PPP: 0.99 (ref 0.85–1.19)
LACTATE SERPL-SCNC: 1.7 MMOL/L (ref 0.5–2)
LIPASE SERPL-CCNC: 12 U/L (ref 11–82)
LYMPHOCYTES # BLD AUTO: 2.32 THOUSANDS/ΜL (ref 0.6–4.47)
LYMPHOCYTES NFR BLD AUTO: 18 % (ref 14–44)
MAGNESIUM SERPL-MCNC: 1.9 MG/DL (ref 1.9–2.7)
MCH RBC QN AUTO: 29.5 PG (ref 26.8–34.3)
MCHC RBC AUTO-ENTMCNC: 31.2 G/DL (ref 31.4–37.4)
MCV RBC AUTO: 95 FL (ref 82–98)
MONOCYTES # BLD AUTO: 1.11 THOUSAND/ΜL (ref 0.17–1.22)
MONOCYTES NFR BLD AUTO: 9 % (ref 4–12)
NEUTROPHILS # BLD AUTO: 8.5 THOUSANDS/ΜL (ref 1.85–7.62)
NEUTS SEG NFR BLD AUTO: 66 % (ref 43–75)
NRBC BLD AUTO-RTO: 0 /100 WBCS
PLATELET # BLD AUTO: 202 THOUSANDS/UL (ref 149–390)
PMV BLD AUTO: 9.7 FL (ref 8.9–12.7)
POTASSIUM SERPL-SCNC: 4.1 MMOL/L (ref 3.5–5.3)
PROT SERPL-MCNC: 7.5 G/DL (ref 6.4–8.4)
PROTHROMBIN TIME: 13.5 SECONDS (ref 12.3–15)
RBC # BLD AUTO: 4.2 MILLION/UL (ref 3.88–5.62)
RH BLD: POSITIVE
SODIUM SERPL-SCNC: 140 MMOL/L (ref 135–147)
SPECIMEN EXPIRATION DATE: NORMAL
WBC # BLD AUTO: 12.76 THOUSAND/UL (ref 4.31–10.16)

## 2024-11-05 PROCEDURE — 74178 CT ABD&PLV WO CNTR FLWD CNTR: CPT

## 2024-11-05 PROCEDURE — 86850 RBC ANTIBODY SCREEN: CPT | Performed by: EMERGENCY MEDICINE

## 2024-11-05 PROCEDURE — 85610 PROTHROMBIN TIME: CPT | Performed by: EMERGENCY MEDICINE

## 2024-11-05 PROCEDURE — 99285 EMERGENCY DEPT VISIT HI MDM: CPT

## 2024-11-05 PROCEDURE — 93005 ELECTROCARDIOGRAM TRACING: CPT

## 2024-11-05 PROCEDURE — 83735 ASSAY OF MAGNESIUM: CPT | Performed by: EMERGENCY MEDICINE

## 2024-11-05 PROCEDURE — 85025 COMPLETE CBC W/AUTO DIFF WBC: CPT | Performed by: EMERGENCY MEDICINE

## 2024-11-05 PROCEDURE — 83605 ASSAY OF LACTIC ACID: CPT | Performed by: EMERGENCY MEDICINE

## 2024-11-05 PROCEDURE — 84484 ASSAY OF TROPONIN QUANT: CPT | Performed by: EMERGENCY MEDICINE

## 2024-11-05 PROCEDURE — 80053 COMPREHEN METABOLIC PANEL: CPT | Performed by: EMERGENCY MEDICINE

## 2024-11-05 PROCEDURE — 99285 EMERGENCY DEPT VISIT HI MDM: CPT | Performed by: EMERGENCY MEDICINE

## 2024-11-05 PROCEDURE — 36415 COLL VENOUS BLD VENIPUNCTURE: CPT | Performed by: EMERGENCY MEDICINE

## 2024-11-05 PROCEDURE — 85730 THROMBOPLASTIN TIME PARTIAL: CPT | Performed by: EMERGENCY MEDICINE

## 2024-11-05 PROCEDURE — 96365 THER/PROPH/DIAG IV INF INIT: CPT

## 2024-11-05 PROCEDURE — 83690 ASSAY OF LIPASE: CPT | Performed by: EMERGENCY MEDICINE

## 2024-11-05 PROCEDURE — 86900 BLOOD TYPING SEROLOGIC ABO: CPT | Performed by: EMERGENCY MEDICINE

## 2024-11-05 PROCEDURE — 96361 HYDRATE IV INFUSION ADD-ON: CPT

## 2024-11-05 PROCEDURE — 86901 BLOOD TYPING SEROLOGIC RH(D): CPT | Performed by: EMERGENCY MEDICINE

## 2024-11-05 RX ADMIN — SODIUM CHLORIDE 1000 ML: 0.9 INJECTION, SOLUTION INTRAVENOUS at 20:21

## 2024-11-05 RX ADMIN — IOHEXOL 100 ML: 350 INJECTION, SOLUTION INTRAVENOUS at 20:58

## 2024-11-05 RX ADMIN — PIPERACILLIN AND TAZOBACTAM 4.5 G: 36; 4.5 INJECTION, POWDER, FOR SOLUTION INTRAVENOUS at 22:33

## 2024-11-06 LAB
ATRIAL RATE: 104 BPM
P AXIS: 79 DEGREES
PR INTERVAL: 152 MS
QRS AXIS: -83 DEGREES
QRSD INTERVAL: 128 MS
QT INTERVAL: 364 MS
QTC INTERVAL: 478 MS
T WAVE AXIS: 47 DEGREES
VENTRICULAR RATE: 104 BPM

## 2024-11-06 PROCEDURE — 93010 ELECTROCARDIOGRAM REPORT: CPT | Performed by: INTERNAL MEDICINE

## 2024-11-06 NOTE — ED PROVIDER NOTES
Time reflects when diagnosis was documented in both MDM as applicable and the Disposition within this note       Time User Action Codes Description Comment    11/5/2024 10:23 PM Jeff Rowley Add [K52.9] Colitis           ED Disposition       ED Disposition   Discharge    Condition   Stable    Date/Time   Tue Nov 5, 2024 10:23 PM    Comment   Ricki Shine discharge to home/self care.                   Assessment & Plan       Medical Decision Making  Amount and/or Complexity of Data Reviewed  Labs: ordered. Decision-making details documented in ED Course.  Radiology: ordered. Decision-making details documented in ED Course.  ECG/medicine tests: ordered and independent interpretation performed. Decision-making details documented in ED Course.  Discussion of management or test interpretation with external provider(s): At risk for but not limited to STEMI, NSTEMI, cholelithiasis, cholecystitis, peptic ulcer disease, gastritis, pancreatitis, diverticulitis, colitis, bowel obstruction, appendicitis, UTI, ureteral stone, kidney stone, inflammatory bowel disease.    Risk  Prescription drug management.        ED Course as of 11/05/24 2224 Tue Nov 05, 2024   2030 Hemoglobin: 12.4  Hemoglobin was 13 2 weeks ago.   2221 Discussed with patient and family about lab and CAT scan results.  Feels comfortable going home.  Will return if symptoms worsen.       Medications   piperacillin-tazobactam (ZOSYN) 4.5 g in sodium chloride 0.9 % 100 mL IVPB (has no administration in time range)   sodium chloride 0.9 % bolus 1,000 mL (0 mL Intravenous Stopped 11/5/24 2121)   iohexol (OMNIPAQUE) 350 MG/ML injection (MULTI-DOSE) 100 mL (100 mL Intravenous Given 11/5/24 2058)       ED Risk Strat Scores                           SBIRT 20yo+      Flowsheet Row Most Recent Value   Initial Alcohol Screen: US AUDIT-C     1. How often do you have a drink containing alcohol? 0 Filed at: 11/05/2024 2012   2. How many drinks containing alcohol do  you have on a typical day you are drinking?  0 Filed at: 11/05/2024 2012   3b. FEMALE Any Age, or MALE 65+: How often do you have 4 or more drinks on one occassion? 0 Filed at: 11/05/2024 2012   Audit-C Score 0 Filed at: 11/05/2024 2012   ISABEL: How many times in the past year have you...    Used an illegal drug or used a prescription medication for non-medical reasons? Never Filed at: 11/05/2024 2012                            History of Present Illness       Chief Complaint   Patient presents with    Abdominal Pain    Rectal Bleeding     Pt reports lower back pain and left sided abdominal pain that started on Sunday. Also c/o dizziness and rectal bleeding that is dark red. Denies fevers and N/V. Takes a baby aspirin daily. Wears 2L/NC chronically.        Past Medical History:   Diagnosis Date    COPD (chronic obstructive pulmonary disease) ([facility])     Diabetes mellitus ([facility])     Hyperlipidemia     Hypertension       Past Surgical History:   Procedure Laterality Date    CHOLECYSTECTOMY      CORONARY ARTERY BYPASS GRAFT      HERNIA REPAIR        History reviewed. No pertinent family history.   Social History     Tobacco Use    Smoking status: Former     Current packs/day: 1.00     Types: Cigarettes    Smokeless tobacco: Never   Vaping Use    Vaping status: Never Used   Substance Use Topics    Alcohol use: Yes     Comment: Socially    Drug use: Never      E-Cigarette/Vaping    E-Cigarette Use Never User       E-Cigarette/Vaping Substances      I have reviewed and agree with the history as documented.     Patient complains of lower abdominal pain and back pain over the last 2 days.  Getting progressively worse.  Having dark red bowel movements.  Takes a baby aspirin daily.  No history of diverticulitis.  No fevers or chills.  No nausea vomiting or diarrhea.      History provided by:  Patient   used: No    Abdominal Pain  Pain location:  LLQ  Pain quality: aching    Pain radiates to:  Does not  radiate  Pain severity:  Mild  Onset quality:  Gradual  Duration:  2 days  Timing:  Constant  Progression:  Worsening  Chronicity:  New  Context: not awakening from sleep, not eating, not laxative use, not recent illness, not sick contacts and not suspicious food intake    Relieved by:  Nothing  Worsened by:  Nothing  Ineffective treatments:  None tried  Associated symptoms: anorexia    Associated symptoms: no belching, no chest pain, no chills, no constipation, no cough, no diarrhea, no dysuria, no fever, no hematuria, no nausea, no shortness of breath, no sore throat and no vomiting        Review of Systems   Constitutional:  Negative for chills and fever.   HENT:  Negative for ear pain, hearing loss, sore throat, trouble swallowing and voice change.    Eyes:  Negative for pain and discharge.   Respiratory:  Negative for cough, shortness of breath and wheezing.    Cardiovascular:  Negative for chest pain and palpitations.   Gastrointestinal:  Positive for abdominal pain, anorexia and blood in stool. Negative for constipation, diarrhea, nausea and vomiting.   Genitourinary:  Negative for dysuria, flank pain, frequency and hematuria.   Musculoskeletal:  Negative for joint swelling, neck pain and neck stiffness.   Skin:  Negative for rash and wound.   Neurological:  Negative for dizziness, seizures, syncope, facial asymmetry and headaches.   Psychiatric/Behavioral:  Negative for hallucinations, self-injury and suicidal ideas.    All other systems reviewed and are negative.          Objective       ED Triage Vitals [11/05/24 2009]   Temperature Pulse Blood Pressure Respirations SpO2 Patient Position - Orthostatic VS   97.5 °F (36.4 °C) (!) 114 141/73 20 90 % Lying      Temp Source Heart Rate Source BP Location FiO2 (%) Pain Score    Temporal Monitor Right arm -- --      Vitals      Date and Time Temp Pulse SpO2 Resp BP Pain Score FACES Pain Rating User   11/05/24 2130 -- 95 96 % 18 192/82 -- -- MR   11/05/24 2009 97.5  °F (36.4 °C) 114 90 % 20 141/73 -- -- AR            Physical Exam  Vitals and nursing note reviewed.   Constitutional:       General: He is not in acute distress.     Appearance: He is well-developed.   HENT:      Head: Normocephalic and atraumatic.      Right Ear: External ear normal.      Left Ear: External ear normal.   Eyes:      General: No scleral icterus.        Right eye: No discharge.         Left eye: No discharge.      Extraocular Movements: Extraocular movements intact.      Conjunctiva/sclera: Conjunctivae normal.   Cardiovascular:      Rate and Rhythm: Normal rate and regular rhythm.      Heart sounds: Normal heart sounds. No murmur heard.  Pulmonary:      Effort: Pulmonary effort is normal.      Breath sounds: Normal breath sounds. No wheezing or rales.   Abdominal:      General: Bowel sounds are normal. There is no distension.      Palpations: Abdomen is soft.      Tenderness: There is abdominal tenderness in the left lower quadrant. There is no guarding or rebound.   Genitourinary:     Comments: Brown stool.  Heme positive.  No hemorrhoids noted.  Musculoskeletal:         General: No deformity. Normal range of motion.      Cervical back: Normal range of motion and neck supple.   Skin:     General: Skin is warm and dry.      Findings: No rash.   Neurological:      General: No focal deficit present.      Mental Status: He is alert and oriented to person, place, and time.      Cranial Nerves: No cranial nerve deficit.   Psychiatric:         Mood and Affect: Mood normal.         Behavior: Behavior normal.         Thought Content: Thought content normal.         Judgment: Judgment normal.         Results Reviewed       Procedure Component Value Units Date/Time    HS Troponin 0hr (reflex protocol) [[id number]]  (Normal) Collected: 11/05/24 2020    Lab Status: Final result Specimen: Blood from Arm, Right Updated: 11/05/24 2058     hs TnI 0hr 8 ng/L     Comprehensive metabolic panel [[id number]] Collected:  11/05/24 2020    Lab Status: Final result Specimen: Blood from Arm, Right Updated: 11/05/24 2050     Sodium 140 mmol/L      Potassium 4.1 mmol/L      Chloride 105 mmol/L      CO2 29 mmol/L      ANION GAP 6 mmol/L      BUN 20 mg/dL      Creatinine 1.17 mg/dL      Glucose 108 mg/dL      Calcium 8.8 mg/dL      AST 13 U/L      ALT 10 U/L      Alkaline Phosphatase 70 U/L      Total Protein 7.5 g/dL      Albumin 4.1 g/dL      Total Bilirubin 0.28 mg/dL      eGFR 59 ml/min/1.73sq m     Narrative:      National Kidney Disease Foundation guidelines for Chronic Kidney Disease (CKD):     Stage 1 with normal or high GFR (GFR > 90 mL/min/1.73 square meters)    Stage 2 Mild CKD (GFR = 60-89 mL/min/1.73 square meters)    Stage 3A Moderate CKD (GFR = 45-59 mL/min/1.73 square meters)    Stage 3B Moderate CKD (GFR = 30-44 mL/min/1.73 square meters)    Stage 4 Severe CKD (GFR = 15-29 mL/min/1.73 square meters)    Stage 5 End Stage CKD (GFR <15 mL/min/1.73 square meters)  Note: GFR calculation is accurate only with a steady state creatinine    Lipase [[id number]]  (Normal) Collected: 11/05/24 2020    Lab Status: Final result Specimen: Blood from Arm, Right Updated: 11/05/24 2050     Lipase 12 u/L     Magnesium [[id number]]  (Normal) Collected: 11/05/24 2020    Lab Status: Final result Specimen: Blood from Arm, Right Updated: 11/05/24 2050     Magnesium 1.9 mg/dL     Lactic acid, plasma (w/reflex if result > 2.0) [[id number]]  (Normal) Collected: 11/05/24 2020    Lab Status: Final result Specimen: Blood from Arm, Right Updated: 11/05/24 2048     LACTIC ACID 1.7 mmol/L     Narrative:      Result may be elevated if tourniquet was used during collection.    Protime-INR [[id number]]  (Normal) Collected: 11/05/24 2020    Lab Status: Final result Specimen: Blood from Arm, Right Updated: 11/05/24 2046     Protime 13.5 seconds      INR 0.99    Narrative:      INR Therapeutic Range    Indication                                             INR  Range      Atrial Fibrillation                                               2.0-3.0  Hypercoagulable State                                    2.0.2.3  Left Ventricular Asist Device                            2.0-3.0  Mechanical Heart Valve                                  -    Aortic(with afib, MI, embolism, HF, LA enlargement,    and/or coagulopathy)                                     2.0-3.0 (2.5-3.5)     Mitral                                                             2.5-3.5  Prosthetic/Bioprosthetic Heart Valve               2.0-3.0  Venous thromboembolism (VTE: VT, PE        2.0-3.0    APTT [[id number]]  (Normal) Collected: 11/05/24 2020    Lab Status: Final result Specimen: Blood from Arm, Right Updated: 11/05/24 2046     PTT 33 seconds     CBC and differential [[id number]]  (Abnormal) Collected: 11/05/24 2020    Lab Status: Final result Specimen: Blood from Arm, Right Updated: 11/05/24 2029     WBC 12.76 Thousand/uL      RBC 4.20 Million/uL      Hemoglobin 12.4 g/dL      Hematocrit 39.8 %      MCV 95 fL      MCH 29.5 pg      MCHC 31.2 g/dL      RDW 13.2 %      MPV 9.7 fL      Platelets 202 Thousands/uL      nRBC 0 /100 WBCs      Segmented % 66 %      Immature Grans % 1 %      Lymphocytes % 18 %      Monocytes % 9 %      Eosinophils Relative 6 %      Basophils Relative 0 %      Absolute Neutrophils 8.50 Thousands/µL      Absolute Immature Grans 0.06 Thousand/uL      Absolute Lymphocytes 2.32 Thousands/µL      Absolute Monocytes 1.11 Thousand/µL      Eosinophils Absolute 0.72 Thousand/µL      Basophils Absolute 0.05 Thousands/µL             CT high volume bleeding scan abdomen pelvis   Final Interpretation by Edmund Stevenson MD (11/05 2220)      1.  No evidence of contrast extravasation to suggest location of gastrointestinal bleeding at this time.   2.  There is moderate diffuse thickening of the descending colon with surrounding mild fat stranding which may be due to focal nonspecific colitis and less likely  diverticulitis.   3.  Moderate to severe atherosclerotic calcified and noncalcified plaques within the abdominal aorta and branch vessels. Moderate stenosis of the origin of the right renal artery. Moderate to severe stenosis at the origin of the superior mesenteric    artery. 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   4.  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      The study was marked in EPIC for immediate notification.      Workstation performed: [id number]             ECG 12 Lead Documentation Only    Date/Time: 2024 8:42 PM    Performed by: Jeff Rowley MD  Authorized by: Jeff Rowley MD    ECG reviewed by me, the ED Provider: yes    Patient location:  ED  Previous ECG:     Previous ECG:  Unavailable  Rate:     ECG rate:  100  Rhythm:     Rhythm: sinus rhythm    Ectopy:     Ectopy: PAC and PVCs    QRS:     QRS axis:  Normal      ED Medication and Procedure Management   Prior to Admission Medications   Prescriptions Last Dose Informant Patient Reported? Taking?   Lancets (OneTouch Delica Plus [id number]) MISC   Yes No   Sig: USE 1 LANCET TO CHECK GLUCOSE TWICE DAILY   OneTouch Ultra test strip   Yes No   Si (two) times a day Test blood sugar   amLODIPine (NORVASC) 5 mg tablet   Yes No   Sig: Take 5 mg by mouth daily   aspirin 81 mg chewable tablet   Yes No   Sig: Chew 81 mg daily   ergocalciferol (VITAMIN D2) 50,000 units   Yes No   Sig: TAKE ONE TABLET BY MOUTH ONE TIME A MONTH   gabapentin (NEURONTIN) 100 mg capsule   Yes No   Sig: Take 100 mg by mouth 2 (two) times a day   metFORMIN (GLUCOPHAGE) 500 mg tablet   Yes No   Sig: Take 500 mg by mouth daily   phenazopyridine (PYRIDIUM) 200 mg tablet   No No   Sig: Take 1 tablet (200 mg total) by mouth 3 (three) times a day   Patient not taking: Reported on 2023   pravastatin (PRAVACHOL) 40 mg tablet   Yes No   Sig: Take 40 mg by mouth daily   tamsulosin (FLOMAX) 0.4  mg   Yes No   Sig: Take 0.4 mg by mouth daily      Facility-Administered Medications: None     Patient's Medications   Discharge Prescriptions    AMOXICILLIN-CLAVULANATE (AUGMENTIN) 875-125 MG PER TABLET    Take 1 tablet by mouth every 12 (twelve) hours for 10 days       Start Date: 11/5/2024 End Date: 11/15/2024       Order Dose: 1 tablet       Quantity: 20 tablet    Refills: 0     No discharge procedures on file.  ED SEPSIS DOCUMENTATION   Time reflects when diagnosis was documented in both MDM as applicable and the Disposition within this note       Time User Action Codes Description Comment    11/5/2024 10:23 PM Jeff Rowley Add [K52.9] Colitis                  Jeff Rowley MD  11/05/24 7027

## 2025-02-05 ENCOUNTER — APPOINTMENT (EMERGENCY)
Dept: CT IMAGING | Facility: HOSPITAL | Age: 80
DRG: 191 | End: 2025-02-05
Payer: COMMERCIAL

## 2025-02-05 ENCOUNTER — HOSPITAL ENCOUNTER (INPATIENT)
Facility: HOSPITAL | Age: 80
LOS: 1 days | Discharge: HOME | DRG: 191 | End: 2025-02-07
Attending: EMERGENCY MEDICINE | Admitting: STUDENT IN AN ORGANIZED HEALTH CARE EDUCATION/TRAINING PROGRAM
Payer: COMMERCIAL

## 2025-02-05 DIAGNOSIS — R91.1 PULMONARY NODULE: ICD-10-CM

## 2025-02-05 DIAGNOSIS — I71.9 AORTIC ANEURYSM (HCC): ICD-10-CM

## 2025-02-05 DIAGNOSIS — R10.9 ABDOMINAL PAIN: Primary | ICD-10-CM

## 2025-02-05 DIAGNOSIS — J44.1 COPD WITH ACUTE EXACERBATION (HCC): ICD-10-CM

## 2025-02-05 DIAGNOSIS — F17.200 TOBACCO USE DISORDER: ICD-10-CM

## 2025-02-05 PROBLEM — E78.49 OTHER HYPERLIPIDEMIA: Status: ACTIVE | Noted: 2025-02-05

## 2025-02-05 PROBLEM — E11.40 TYPE 2 DIABETES MELLITUS WITH DIABETIC NEUROPATHY, WITHOUT LONG-TERM CURRENT USE OF INSULIN (HCC): Status: ACTIVE | Noted: 2025-02-05

## 2025-02-05 PROBLEM — I10 PRIMARY HYPERTENSION: Status: ACTIVE | Noted: 2025-02-05

## 2025-02-05 PROBLEM — I71.43 INFRARENAL ABDOMINAL AORTIC ANEURYSM (AAA) WITHOUT RUPTURE (HCC): Status: ACTIVE | Noted: 2025-02-05

## 2025-02-05 LAB
2HR DELTA HS TROPONIN: 0 NG/L
ALBUMIN SERPL BCG-MCNC: 4.1 G/DL (ref 3.5–5)
ALP SERPL-CCNC: 75 U/L (ref 34–104)
ALT SERPL W P-5'-P-CCNC: 7 U/L (ref 7–52)
ANION GAP SERPL CALCULATED.3IONS-SCNC: 7 MMOL/L (ref 4–13)
AST SERPL W P-5'-P-CCNC: 14 U/L (ref 13–39)
BASOPHILS # BLD AUTO: 0.04 THOUSANDS/ΜL (ref 0–0.1)
BASOPHILS NFR BLD AUTO: 0 % (ref 0–1)
BILIRUB SERPL-MCNC: 0.39 MG/DL (ref 0.2–1)
BNP SERPL-MCNC: 91 PG/ML (ref 0–100)
BUN SERPL-MCNC: 16 MG/DL (ref 5–25)
CALCIUM SERPL-MCNC: 9.3 MG/DL (ref 8.4–10.2)
CARDIAC TROPONIN I PNL SERPL HS: 8 NG/L (ref ?–50)
CARDIAC TROPONIN I PNL SERPL HS: 8 NG/L (ref ?–50)
CHLORIDE SERPL-SCNC: 99 MMOL/L (ref 96–108)
CO2 SERPL-SCNC: 31 MMOL/L (ref 21–32)
CREAT SERPL-MCNC: 1.12 MG/DL (ref 0.6–1.3)
EOSINOPHIL # BLD AUTO: 0.25 THOUSAND/ΜL (ref 0–0.61)
EOSINOPHIL NFR BLD AUTO: 2 % (ref 0–6)
ERYTHROCYTE [DISTWIDTH] IN BLOOD BY AUTOMATED COUNT: 13 % (ref 11.6–15.1)
FLUAV AG UPPER RESP QL IA.RAPID: NEGATIVE
FLUAV RNA RESP QL NAA+PROBE: NEGATIVE
FLUBV AG UPPER RESP QL IA.RAPID: NEGATIVE
FLUBV RNA RESP QL NAA+PROBE: NEGATIVE
GFR SERPL CREATININE-BSD FRML MDRD: 63 ML/MIN/1.73SQ M
GLUCOSE SERPL-MCNC: 128 MG/DL (ref 65–140)
GLUCOSE SERPL-MCNC: 180 MG/DL (ref 65–140)
HCT VFR BLD AUTO: 44.5 % (ref 36.5–49.3)
HGB BLD-MCNC: 13.9 G/DL (ref 12–17)
IMM GRANULOCYTES # BLD AUTO: 0.04 THOUSAND/UL (ref 0–0.2)
IMM GRANULOCYTES NFR BLD AUTO: 0 % (ref 0–2)
LYMPHOCYTES # BLD AUTO: 0.87 THOUSANDS/ΜL (ref 0.6–4.47)
LYMPHOCYTES NFR BLD AUTO: 7 % (ref 14–44)
MAGNESIUM SERPL-MCNC: 2.1 MG/DL (ref 1.9–2.7)
MCH RBC QN AUTO: 29.6 PG (ref 26.8–34.3)
MCHC RBC AUTO-ENTMCNC: 31.2 G/DL (ref 31.4–37.4)
MCV RBC AUTO: 95 FL (ref 82–98)
MONOCYTES # BLD AUTO: 1.19 THOUSAND/ΜL (ref 0.17–1.22)
MONOCYTES NFR BLD AUTO: 9 % (ref 4–12)
NEUTROPHILS # BLD AUTO: 10.25 THOUSANDS/ΜL (ref 1.85–7.62)
NEUTS SEG NFR BLD AUTO: 82 % (ref 43–75)
NRBC BLD AUTO-RTO: 0 /100 WBCS
PLATELET # BLD AUTO: 232 THOUSANDS/UL (ref 149–390)
PMV BLD AUTO: 9.8 FL (ref 8.9–12.7)
POTASSIUM SERPL-SCNC: 4.5 MMOL/L (ref 3.5–5.3)
PROCALCITONIN SERPL-MCNC: 0.13 NG/ML
PROT SERPL-MCNC: 7.6 G/DL (ref 6.4–8.4)
RBC # BLD AUTO: 4.69 MILLION/UL (ref 3.88–5.62)
RSV RNA RESP QL NAA+PROBE: NEGATIVE
SARS-COV+SARS-COV-2 AG RESP QL IA.RAPID: NEGATIVE
SARS-COV-2 RNA RESP QL NAA+PROBE: NEGATIVE
SODIUM SERPL-SCNC: 137 MMOL/L (ref 135–147)
WBC # BLD AUTO: 12.64 THOUSAND/UL (ref 4.31–10.16)

## 2025-02-05 PROCEDURE — 85025 COMPLETE CBC W/AUTO DIFF WBC: CPT | Performed by: EMERGENCY MEDICINE

## 2025-02-05 PROCEDURE — 87811 SARS-COV-2 COVID19 W/OPTIC: CPT | Performed by: EMERGENCY MEDICINE

## 2025-02-05 PROCEDURE — 87804 INFLUENZA ASSAY W/OPTIC: CPT | Performed by: EMERGENCY MEDICINE

## 2025-02-05 PROCEDURE — 0241U HB NFCT DS VIR RESP RNA 4 TRGT: CPT

## 2025-02-05 PROCEDURE — 99285 EMERGENCY DEPT VISIT HI MDM: CPT | Performed by: EMERGENCY MEDICINE

## 2025-02-05 PROCEDURE — 80053 COMPREHEN METABOLIC PANEL: CPT | Performed by: EMERGENCY MEDICINE

## 2025-02-05 PROCEDURE — 84484 ASSAY OF TROPONIN QUANT: CPT | Performed by: EMERGENCY MEDICINE

## 2025-02-05 PROCEDURE — 84145 PROCALCITONIN (PCT): CPT

## 2025-02-05 PROCEDURE — 82948 REAGENT STRIP/BLOOD GLUCOSE: CPT

## 2025-02-05 PROCEDURE — 93005 ELECTROCARDIOGRAM TRACING: CPT

## 2025-02-05 PROCEDURE — 83735 ASSAY OF MAGNESIUM: CPT | Performed by: EMERGENCY MEDICINE

## 2025-02-05 PROCEDURE — 94640 AIRWAY INHALATION TREATMENT: CPT

## 2025-02-05 PROCEDURE — 36415 COLL VENOUS BLD VENIPUNCTURE: CPT | Performed by: EMERGENCY MEDICINE

## 2025-02-05 PROCEDURE — 99223 1ST HOSP IP/OBS HIGH 75: CPT

## 2025-02-05 PROCEDURE — 71275 CT ANGIOGRAPHY CHEST: CPT

## 2025-02-05 PROCEDURE — 74174 CTA ABD&PLVS W/CONTRAST: CPT

## 2025-02-05 PROCEDURE — 83880 ASSAY OF NATRIURETIC PEPTIDE: CPT | Performed by: EMERGENCY MEDICINE

## 2025-02-05 PROCEDURE — 99285 EMERGENCY DEPT VISIT HI MDM: CPT

## 2025-02-05 RX ORDER — ASPIRIN 81 MG/1
81 TABLET, CHEWABLE ORAL DAILY
Status: DISCONTINUED | OUTPATIENT
Start: 2025-02-06 | End: 2025-02-07 | Stop reason: HOSPADM

## 2025-02-05 RX ORDER — PREDNISONE 20 MG/1
40 TABLET ORAL ONCE
Status: COMPLETED | OUTPATIENT
Start: 2025-02-05 | End: 2025-02-05

## 2025-02-05 RX ORDER — TAMSULOSIN HYDROCHLORIDE 0.4 MG/1
0.4 CAPSULE ORAL DAILY
Status: DISCONTINUED | OUTPATIENT
Start: 2025-02-06 | End: 2025-02-07 | Stop reason: HOSPADM

## 2025-02-05 RX ORDER — IPRATROPIUM BROMIDE AND ALBUTEROL SULFATE 2.5; .5 MG/3ML; MG/3ML
3 SOLUTION RESPIRATORY (INHALATION) EVERY 6 HOURS PRN
Qty: 360 ML | Refills: 0 | Status: SHIPPED | OUTPATIENT
Start: 2025-02-05

## 2025-02-05 RX ORDER — DOXYCYCLINE 100 MG/1
100 CAPSULE ORAL EVERY 12 HOURS
Status: DISCONTINUED | OUTPATIENT
Start: 2025-02-06 | End: 2025-02-07 | Stop reason: HOSPADM

## 2025-02-05 RX ORDER — GABAPENTIN 100 MG/1
100 CAPSULE ORAL 2 TIMES DAILY
Status: DISCONTINUED | OUTPATIENT
Start: 2025-02-05 | End: 2025-02-07 | Stop reason: HOSPADM

## 2025-02-05 RX ORDER — AMLODIPINE BESYLATE 5 MG/1
5 TABLET ORAL DAILY
Status: DISCONTINUED | OUTPATIENT
Start: 2025-02-06 | End: 2025-02-07 | Stop reason: HOSPADM

## 2025-02-05 RX ORDER — BUDESONIDE 0.5 MG/2ML
0.5 INHALANT ORAL
Status: DISCONTINUED | OUTPATIENT
Start: 2025-02-05 | End: 2025-02-07 | Stop reason: HOSPADM

## 2025-02-05 RX ORDER — ACETAMINOPHEN 325 MG/1
650 TABLET ORAL EVERY 6 HOURS PRN
Status: DISCONTINUED | OUTPATIENT
Start: 2025-02-05 | End: 2025-02-07 | Stop reason: HOSPADM

## 2025-02-05 RX ORDER — NICOTINE 21 MG/24HR
14 PATCH, TRANSDERMAL 24 HOURS TRANSDERMAL DAILY
Status: DISCONTINUED | OUTPATIENT
Start: 2025-02-05 | End: 2025-02-07 | Stop reason: HOSPADM

## 2025-02-05 RX ORDER — DOXYCYCLINE 100 MG/1
100 CAPSULE ORAL 2 TIMES DAILY
Qty: 14 CAPSULE | Refills: 0 | Status: SHIPPED | OUTPATIENT
Start: 2025-02-05 | End: 2025-02-12

## 2025-02-05 RX ORDER — HEPARIN SODIUM 5000 [USP'U]/ML
5000 INJECTION, SOLUTION INTRAVENOUS; SUBCUTANEOUS EVERY 8 HOURS SCHEDULED
Status: DISCONTINUED | OUTPATIENT
Start: 2025-02-05 | End: 2025-02-07 | Stop reason: HOSPADM

## 2025-02-05 RX ORDER — DOXYCYCLINE 100 MG/1
100 CAPSULE ORAL ONCE
Status: COMPLETED | OUTPATIENT
Start: 2025-02-05 | End: 2025-02-05

## 2025-02-05 RX ORDER — IPRATROPIUM BROMIDE AND ALBUTEROL SULFATE 2.5; .5 MG/3ML; MG/3ML
3 SOLUTION RESPIRATORY (INHALATION) ONCE
Status: COMPLETED | OUTPATIENT
Start: 2025-02-05 | End: 2025-02-05

## 2025-02-05 RX ORDER — SODIUM CHLORIDE FOR INHALATION 0.9 %
3 VIAL, NEBULIZER (ML) INHALATION
Status: DISCONTINUED | OUTPATIENT
Start: 2025-02-05 | End: 2025-02-06

## 2025-02-05 RX ORDER — PREDNISONE 20 MG/1
20 TABLET ORAL DAILY
Qty: 7 TABLET | Refills: 0 | Status: SHIPPED | OUTPATIENT
Start: 2025-02-05 | End: 2025-02-12

## 2025-02-05 RX ORDER — LEVALBUTEROL INHALATION SOLUTION 1.25 MG/3ML
1.25 SOLUTION RESPIRATORY (INHALATION)
Status: DISCONTINUED | OUTPATIENT
Start: 2025-02-05 | End: 2025-02-07 | Stop reason: HOSPADM

## 2025-02-05 RX ORDER — METHYLPREDNISOLONE SODIUM SUCCINATE 40 MG/ML
40 INJECTION, POWDER, LYOPHILIZED, FOR SOLUTION INTRAMUSCULAR; INTRAVENOUS EVERY 12 HOURS SCHEDULED
Status: DISCONTINUED | OUTPATIENT
Start: 2025-02-06 | End: 2025-02-06

## 2025-02-05 RX ORDER — MAGNESIUM HYDROXIDE/ALUMINUM HYDROXICE/SIMETHICONE 120; 1200; 1200 MG/30ML; MG/30ML; MG/30ML
30 SUSPENSION ORAL EVERY 6 HOURS PRN
Status: DISCONTINUED | OUTPATIENT
Start: 2025-02-05 | End: 2025-02-07 | Stop reason: HOSPADM

## 2025-02-05 RX ORDER — PRAVASTATIN SODIUM 40 MG
40 TABLET ORAL DAILY
Status: DISCONTINUED | OUTPATIENT
Start: 2025-02-06 | End: 2025-02-07 | Stop reason: HOSPADM

## 2025-02-05 RX ORDER — INSULIN LISPRO 100 [IU]/ML
1-6 INJECTION, SOLUTION INTRAVENOUS; SUBCUTANEOUS
Status: DISCONTINUED | OUTPATIENT
Start: 2025-02-05 | End: 2025-02-07 | Stop reason: HOSPADM

## 2025-02-05 RX ADMIN — NICOTINE 14 MG: 14 PATCH, EXTENDED RELEASE TRANSDERMAL at 21:49

## 2025-02-05 RX ADMIN — HEPARIN SODIUM 5000 UNITS: 5000 INJECTION INTRAVENOUS; SUBCUTANEOUS at 21:49

## 2025-02-05 RX ADMIN — Medication 6 MG: at 21:49

## 2025-02-05 RX ADMIN — INSULIN LISPRO 1 UNITS: 100 INJECTION, SOLUTION INTRAVENOUS; SUBCUTANEOUS at 23:11

## 2025-02-05 RX ADMIN — IPRATROPIUM BROMIDE AND ALBUTEROL SULFATE 3 ML: .5; 3 SOLUTION RESPIRATORY (INHALATION) at 20:29

## 2025-02-05 RX ADMIN — DOXYCYCLINE 100 MG: 100 CAPSULE ORAL at 20:06

## 2025-02-05 RX ADMIN — PREDNISONE 40 MG: 20 TABLET ORAL at 20:06

## 2025-02-05 RX ADMIN — GABAPENTIN 100 MG: 100 CAPSULE ORAL at 21:49

## 2025-02-05 RX ADMIN — IOHEXOL 60 ML: 350 INJECTION, SOLUTION INTRAVENOUS at 18:55

## 2025-02-05 RX ADMIN — IPRATROPIUM BROMIDE AND ALBUTEROL SULFATE 3 ML: .5; 3 SOLUTION RESPIRATORY (INHALATION) at 18:30

## 2025-02-05 NOTE — ED PROVIDER NOTES
Time reflects when diagnosis was documented in both MDM as applicable and the Disposition within this note       Time User Action Codes Description Comment    2/5/2025  7:54 PM Bonfante, Pete Add [R10.9] Abdominal pain     2/5/2025  7:54 PM Bonfante, Pete Add [J44.1,  Z77.110] Exacerbation of COPD associated with volcanic smog exposure  (HCC)     2/5/2025  7:54 PM Bonfante, Pete Remove [J44.1,  Z77.110] Exacerbation of COPD associated with volcanic smog exposure  (HCC)     2/5/2025  7:54 PM Bonfante, Pete Add [J44.1] COPD with acute exacerbation (HCC)     2/5/2025  7:55 PM Bonfante, Pete Add [F17.200] Tobacco use disorder     2/5/2025  7:55 PM Bonfante, Pete Add [R91.1] Pulmonary nodule     2/5/2025  7:55 PM Bonfante, Pete Add [I71.9] Aortic aneurysm (HCC)           ED Disposition       ED Disposition   Admit    Condition   Stable    Date/Time   Wed Feb 5, 2025  8:11 PM    Comment                  Assessment & Plan       Medical Decision Making  Patient presented to the emergency department and a MSE was performed. The patient was evaluated for complaint related to acute chest pain.  Patient is potentially at risk for, but not limited to, acute coronary syndrome, arrhythmia, myocardial infarction, pulmonary embolism, pneumothorax, infectious process of the lungs such as pneumonia, GERD, esophagitis, muscle strain, or costochondritis.  Several of these diagnoses have been evaluated and ruled out by history and physical.  As needed, patient will be further evaluated with laboratory and imaging studies.  Higher level diagnostics, such as CT imaging or ultrasound, may also be required.  Please see work-up portion of the note for further evaluation of patient's risk.  Socioeconomic factors were also considered as part of the decision-making process.  Unless otherwise stated in the chart or patient is admitted as elsewhere documented, any previously prescribed medications will be maintained.        Problems  Addressed:  Abdominal pain: undiagnosed new problem with uncertain prognosis  Aortic aneurysm (HCC): undiagnosed new problem with uncertain prognosis  COPD with acute exacerbation (HCC): chronic illness or injury with exacerbation, progression, or side effects of treatment  Pulmonary nodule: undiagnosed new problem with uncertain prognosis     Details: Known to patient  Tobacco use disorder: chronic illness or injury    Amount and/or Complexity of Data Reviewed  Labs: ordered.  Radiology: ordered.    Risk  Prescription drug management.  Decision regarding hospitalization.  Risk Details: Patient presented to the emergency department and a MSE was performed. The patient was evaluated and diagnosed with acute exacerbation of COPD. This is a new issue that will require additional planned work-up and treatment in a hospitalized setting. As may have been required as part of this evaluation, clinical laboratory test, radiology imaging and medical testing (I.e. EKG) were ordered as necessitated by the patient's presentation. I independently reviewed these studies, imaging and testing. This patient's case is considered to be a considerable risk secondary to the above listed disease process and poses a threat to the patient's well-being and baseline function. Further in-patient diagnostic testing and management, which may include the administration of parenteral medications, is required.            ED Course as of 02/05/25 2013 Wed Feb 05, 2025 1946 Patient with an infrarenal aneurysm measuring 3.2 cm with a 3-year follow-up recommended by radiology.  Also with a pleural-based nodule with a 6-month follow-up recommended.  No emergent findings.   1952 Reviewed findings with patient and his family at bedside.  Patient reports that he is feeling better.  Will try ambulation.  Provided patient maintains an oxygen saturation which is acceptable, can likely plan for discharge.      However, will also trend troponin for final  disposition.   2004 Unfortunately, patient did not do well with ambulation and desaturated to 74%.  Will refer to for admission.       Medications   ipratropium-albuterol (DUO-NEB) 0.5-2.5 mg/3 mL inhalation solution 3 mL (has no administration in time range)   ipratropium-albuterol (DUO-NEB) 0.5-2.5 mg/3 mL inhalation solution 3 mL (3 mL Nebulization Given 2/5/25 1830)   iohexol (OMNIPAQUE) 350 MG/ML injection (MULTI-DOSE) 60 mL (60 mL Intravenous Given 2/5/25 1855)   doxycycline hyclate (VIBRAMYCIN) capsule 100 mg (100 mg Oral Given 2/5/25 2006)   predniSONE tablet 40 mg (40 mg Oral Given 2/5/25 2006)       ED Risk Strat Scores   HEART Risk Score      Flowsheet Row Most Recent Value   Heart Score Risk Calculator    History 0 Filed at: 02/05/2025 1953   ECG 1 Filed at: 02/05/2025 1953   Age 2 Filed at: 02/05/2025 1953   Risk Factors 2 Filed at: 02/05/2025 1953   Troponin 0 Filed at: 02/05/2025 1953   HEART Score 5 Filed at: 02/05/2025 1953          HEART Risk Score      Flowsheet Row Most Recent Value   Heart Score Risk Calculator    History 0 Filed at: 02/05/2025 1953   ECG 1 Filed at: 02/05/2025 1953   Age 2 Filed at: 02/05/2025 1953   Risk Factors 2 Filed at: 02/05/2025 1953   Troponin 0 Filed at: 02/05/2025 1953   HEART Score 5 Filed at: 02/05/2025 1953                            SBIRT 20yo+      Flowsheet Row Most Recent Value   Initial Alcohol Screen: US AUDIT-C     1. How often do you have a drink containing alcohol? 0 Filed at: 02/05/2025 1814   2. How many drinks containing alcohol do you have on a typical day you are drinking?  0 Filed at: 02/05/2025 1814   3a. Male UNDER 65: How often do you have five or more drinks on one occasion? 0 Filed at: 02/05/2025 1814   3b. FEMALE Any Age, or MALE 65+: How often do you have 4 or more drinks on one occassion? 0 Filed at: 02/05/2025 1820   Audit-C Score 0 Filed at: 02/05/2025 1820   ISABEL: How many times in the past year have you...    Used an illegal drug or used  a prescription medication for non-medical reasons? Never Filed at: 02/05/2025 1814                            History of Present Illness       Chief Complaint   Patient presents with    Epigastric Pain     Patient states since waking up today he has had abdominal pain that radiates up into his chest. Also reports he is more short of breath than normal with mid back pain. Wears 2LNC at baseline.        Past Medical History:   Diagnosis Date    COPD (chronic obstructive pulmonary disease) (HCC)     Diabetes mellitus (HCC)     Hyperlipidemia     Hypertension       Past Surgical History:   Procedure Laterality Date    CHOLECYSTECTOMY      CORONARY ARTERY BYPASS GRAFT      HERNIA REPAIR        History reviewed. No pertinent family history.   Social History     Tobacco Use    Smoking status: Former     Current packs/day: 1.00     Types: Cigarettes    Smokeless tobacco: Never   Vaping Use    Vaping status: Never Used   Substance Use Topics    Alcohol use: Yes     Comment: Socially    Drug use: Never      E-Cigarette/Vaping    E-Cigarette Use Never User       E-Cigarette/Vaping Substances      I have reviewed and agree with the history as documented.     Patient is a 79-year-old male with a history of COPD, diabetes, hypertension, hyperlipidemia who is on chronic home O2 who also continues to smoke who presents to the emergency room with chief complaint of back pain, abdominal pain, chest pain.  He reports that this started this morning mostly has back discomfort which radiated into his abdomen and then up into his chest.  Patient reports that he became short of breath.  Patient denies any history of myocardial infarction or stroke.    Patient reports that he was in his normal state of health last evening and awoke with his symptomatology this morning.  He has become increasingly short of breath throughout the day and his family brought him to the emergency room for further evaluation.  He does report that some of the  discomfort and pain is subsided.    Patient presents to the emergency room appearing somewhat cyanotic.  Pulse ox was 88 to 91%.  Now at rest it has stabilized to 91 to 92%.  No history of DVT or pulmonary embolism.  Denies any history of aortic disease process.      History provided by:  Patient and relative  Epigastric Pain  Pain location:  Substernal area and epigastric  Pain quality: aching    Pain radiates to:  Mid back and upper back  Pain radiates to the back: yes    Pain severity:  Moderate  Onset quality:  Unable to specify  Associated symptoms: abdominal pain, back pain and shortness of breath    Associated symptoms: no cough, no fatigue and no fever    Risk factors: diabetes mellitus, high cholesterol, hypertension, male sex and smoking    Risk factors: no coronary artery disease        Review of Systems   Constitutional:  Negative for fatigue and fever.   Respiratory:  Positive for shortness of breath and wheezing. Negative for cough.    Cardiovascular:  Negative for chest pain and leg swelling.   Gastrointestinal:  Positive for abdominal pain.   Musculoskeletal:  Positive for back pain.           Objective       ED Triage Vitals [02/05/25 1814]   Temperature Pulse Blood Pressure Respirations SpO2 Patient Position - Orthostatic VS   97.9 °F (36.6 °C) (!) 112 157/73 20 91 % Lying      Temp Source Heart Rate Source BP Location FiO2 (%) Pain Score    Temporal Monitor Left arm -- No Pain      Vitals      Date and Time Temp Pulse SpO2 Resp BP Pain Score FACES Pain Rating User   02/05/25 1930 -- 99 97 % 22 144/65 -- -- KW   02/05/25 1900 -- 104 96 % 22 156/72 -- -- KW   02/05/25 1814 97.9 °F (36.6 °C) 112 91 % 20 157/73 No Pain -- SBE            Physical Exam  Vitals and nursing note reviewed.   Constitutional:       General: He is in acute distress.      Appearance: He is normal weight. He is ill-appearing. He is not toxic-appearing.   HENT:      Head: Normocephalic and atraumatic.      Right Ear: External  ear normal.      Left Ear: External ear normal.      Nose: Nose normal.      Mouth/Throat:      Mouth: Mucous membranes are dry.   Cardiovascular:      Rate and Rhythm: Tachycardia present.      Heart sounds: No murmur heard.  Pulmonary:      Effort: Pulmonary effort is normal. No respiratory distress.      Breath sounds: Decreased breath sounds and wheezing present.   Abdominal:      General: Abdomen is flat. Bowel sounds are decreased. There is no distension.      Tenderness: There is no abdominal tenderness.   Musculoskeletal:         General: No signs of injury.   Skin:     Coloration: Skin is cyanotic. Skin is not pale.   Neurological:      General: No focal deficit present.      Mental Status: He is alert.   Psychiatric:         Mood and Affect: Mood normal.         Thought Content: Thought content normal.         Judgment: Judgment normal.         Results Reviewed       Procedure Component Value Units Date/Time    Procalcitonin [[id number]]     Lab Status: No result Specimen: Blood     FLU/RSV/COVID - if FLU/RSV clinically relevant [[id number]]     Lab Status: No result Specimen: Nares from Nose     HS Troponin I 4hr [[id number]]     Lab Status: No result Specimen: Blood     B-Type Natriuretic Peptide(BNP) [[id number]]  (Normal) Collected: 02/05/25 1829    Lab Status: Final result Specimen: Blood from Arm, Right Updated: 02/05/25 1923     BNP 91 pg/mL     HS Troponin I 2hr [[id number]]     Lab Status: No result Specimen: Blood     HS Troponin 0hr (reflex protocol) [[id number]]  (Normal) Collected: 02/05/25 1829    Lab Status: Final result Specimen: Blood from Arm, Right Updated: 02/05/25 1903     hs TnI 0hr 8 ng/L     Comprehensive metabolic panel [[id number]] Collected: 02/05/25 1829    Lab Status: Final result Specimen: Blood from Arm, Right Updated: 02/05/25 1856     Sodium 137 mmol/L      Potassium 4.5 mmol/L      Chloride 99 mmol/L      CO2 31 mmol/L      ANION GAP 7 mmol/L      BUN 16 mg/dL      Creatinine  1.12 mg/dL      Glucose 128 mg/dL      Calcium 9.3 mg/dL      AST 14 U/L      ALT 7 U/L      Alkaline Phosphatase 75 U/L      Total Protein 7.6 g/dL      Albumin 4.1 g/dL      Total Bilirubin 0.39 mg/dL      eGFR 63 ml/min/1.73sq m     Narrative:      [employer] guidelines for Chronic Kidney Disease (CKD):     Stage 1 with normal or high GFR (GFR > 90 mL/min/1.73 square meters)    Stage 2 Mild CKD (GFR = 60-89 mL/min/1.73 square meters)    Stage 3A Moderate CKD (GFR = 45-59 mL/min/1.73 square meters)    Stage 3B Moderate CKD (GFR = 30-44 mL/min/1.73 square meters)    Stage 4 Severe CKD (GFR = 15-29 mL/min/1.73 square meters)    Stage 5 End Stage CKD (GFR <15 mL/min/1.73 square meters)  Note: GFR calculation is accurate only with a steady state creatinine    Magnesium [[id number]]  (Normal) Collected: 02/05/25 1829    Lab Status: Final result Specimen: Blood from Arm, Right Updated: 02/05/25 1856     Magnesium 2.1 mg/dL     FLU/COVID Rapid Antigen (30 min. TAT) - Preferred screening test in ED [[id number]]  (Normal) Collected: 02/05/25 1829    Lab Status: Final result Specimen: Nares from Nose Updated: 02/05/25 1855     SARS COV Rapid Antigen Negative     Influenza A Rapid Antigen Negative     Influenza B Rapid Antigen Negative    Narrative:      This test has been performed using the Quidel Lian 2 FLU+SARS Antigen test under the Emergency Use Authorization (EUA). This test has been validated by the  and verified by the performing laboratory. The Lian uses lateral flow immunofluorescent sandwich assay to detect SARS-COV, Influenza A and Influenza B Antigen.     The Quidel Lian 2 SARS Antigen test does not differentiate between SARS-CoV and SARS-CoV-2.     Negative results are presumptive and may be confirmed with a molecular assay, if necessary, for patient management. Negative results do not rule out SARS-CoV-2 or influenza infection and should not be used as the sole basis for  treatment or patient management decisions. A negative test result may occur if the level of antigen in a sample is below the limit of detection of this test.     Positive results are indicative of the presence of viral antigens, but do not rule out bacterial infection or co-infection with other viruses.     All test results should be used as an adjunct to clinical observations and other information available to the provider.    FOR PEDIATRIC PATIENTS - copy/paste COVID Guidelines URL to browser: [email or web address]    CBC and differential [[id number]]  (Abnormal) Collected: 02/05/25 1829    Lab Status: Final result Specimen: Blood from Arm, Right Updated: 02/05/25 1838     WBC 12.64 Thousand/uL      RBC 4.69 Million/uL      Hemoglobin 13.9 g/dL      Hematocrit 44.5 %      MCV 95 fL      MCH 29.6 pg      MCHC 31.2 g/dL      RDW 13.0 %      MPV 9.8 fL      Platelets 232 Thousands/uL      nRBC 0 /100 WBCs      Segmented % 82 %      Immature Grans % 0 %      Lymphocytes % 7 %      Monocytes % 9 %      Eosinophils Relative 2 %      Basophils Relative 0 %      Absolute Neutrophils 10.25 Thousands/µL      Absolute Immature Grans 0.04 Thousand/uL      Absolute Lymphocytes 0.87 Thousands/µL      Absolute Monocytes 1.19 Thousand/µL      Eosinophils Absolute 0.25 Thousand/µL      Basophils Absolute 0.04 Thousands/µL             CTA dissection protocol chest/abdomen/pelvis   Final Interpretation by Jose Antonio Mcdonald MD (02/05 1942)      No acute aortic/arterial abnormality.   Diffuse severe atherosclerotic disease as described above.      3.2 cm infrarenal abdominal aortic aneurysm.   Recommend follow-up CT versus ultrasound in 3 years.      Pleural-based 1 cm right lower lobe nodule measuring 1 cm, slightly increased from October 2023 prior.   Recommend follow-up chest CT in 6 months to evaluate for interval change.      No acute abdominopelvic abnormality.      The  study was marked in EPIC for immediate notification.            Workstation performed: [id number]             ECG 12 Lead Documentation Only    Date/Time: 2025 6:28 PM    Performed by: Pete Willard DO  Authorized by: Pete Willard DO    ECG reviewed by me, the ED Provider: yes    Patient location:  ED  Interpretation:     Interpretation: abnormal    Rate:     ECG rate assessment: tachycardic    Rhythm:     Rhythm: sinus tachycardia    Ectopy:     Ectopy: none    QRS:     QRS axis:  Normal  Conduction:     Conduction: abnormal      Abnormal conduction: LAFB    ST segments:     ST segments:  Non-specific  T waves:     T waves: non-specific    Comments:      Nonischemic EKG      ED Medication and Procedure Management   Prior to Admission Medications   Prescriptions Last Dose Informant Patient Reported? Taking?   Lancets (OneTouch Delica Plus [id number]) MISC   Yes Yes   Sig: USE 1 LANCET TO CHECK GLUCOSE TWICE DAILY   OneTouch Ultra test strip   Yes Yes   Si (two) times a day Test blood sugar   amLODIPine (NORVASC) 5 mg tablet   Yes Yes   Sig: Take 5 mg by mouth daily   aspirin 81 mg chewable tablet   Yes Yes   Sig: Chew 81 mg daily   ergocalciferol (VITAMIN D2) 50,000 units   Yes Yes   Sig: TAKE ONE TABLET BY MOUTH ONE TIME A MONTH   gabapentin (NEURONTIN) 100 mg capsule   Yes Yes   Sig: Take 100 mg by mouth 2 (two) times a day   metFORMIN (GLUCOPHAGE) 500 mg tablet   Yes Yes   Sig: Take 500 mg by mouth daily   pravastatin (PRAVACHOL) 40 mg tablet   Yes Yes   Sig: Take 40 mg by mouth daily   tamsulosin (FLOMAX) 0.4 mg   Yes Yes   Sig: Take 0.4 mg by mouth daily      Facility-Administered Medications: None     Current Discharge Medication List        START taking these medications    Details   doxycycline hyclate (VIBRAMYCIN) 100 mg capsule Take 1 capsule (100 mg total) by mouth 2 (two) times a day for 7 days  Qty: 14 capsule, Refills: 0    Associated Diagnoses: COPD with acute exacerbation (HCC)       ipratropium-albuterol (DUO-NEB) 0.5-2.5 mg/3 mL nebulizer solution Take 3 mL by nebulization every 6 (six) hours as needed for wheezing or shortness of breath  Qty: 360 mL, Refills: 0    Associated Diagnoses: COPD with acute exacerbation (HCC)      predniSONE 20 mg tablet Take 1 tablet (20 mg total) by mouth daily for 7 days  Qty: 7 tablet, Refills: 0    Associated Diagnoses: COPD with acute exacerbation (HCC)           CONTINUE these medications which have NOT CHANGED    Details   amLODIPine (NORVASC) 5 mg tablet Take 5 mg by mouth daily      aspirin 81 mg chewable tablet Chew 81 mg daily      ergocalciferol (VITAMIN D2) 50,000 units TAKE ONE TABLET BY MOUTH ONE TIME A MONTH      gabapentin (NEURONTIN) 100 mg capsule Take 100 mg by mouth 2 (two) times a day      Lancets (OneTouch Delica Plus [id number]) MISC USE 1 LANCET TO CHECK GLUCOSE TWICE DAILY      metFORMIN (GLUCOPHAGE) 500 mg tablet Take 500 mg by mouth daily      OneTouch Ultra test strip 2 (two) times a day Test blood sugar      pravastatin (PRAVACHOL) 40 mg tablet Take 40 mg by mouth daily      tamsulosin (FLOMAX) 0.4 mg Take 0.4 mg by mouth daily           No discharge procedures on file.  ED SEPSIS DOCUMENTATION   Time reflects when diagnosis was documented in both MDM as applicable and the Disposition within this note       Time User Action Codes Description Comment    2/5/2025  7:54 PM Pete Willard Add [R10.9] Abdominal pain     2/5/2025  7:54 PM Pete Willard Add [J44.1,  Z77.110] Exacerbation of COPD associated with volcanic smog exposure  (HCC)     2/5/2025  7:54 PM Pete Willard Remove [J44.1,  Z77.110] Exacerbation of COPD associated with volcanic smog exposure  (HCC)     2/5/2025  7:54 PM Pete Willard Add [J44.1] COPD with acute exacerbation (HCC)     2/5/2025  7:55 PM Pete Willard Add [F17.200] Tobacco use disorder     2/5/2025  7:55 PM Pete Willard Add [R91.1] Pulmonary nodule     2/5/2025  7:55 PM Pete Willard [I71.9] Aortic  aneurysm (HCC)                  Pete Willard,   02/05/25 2013

## 2025-02-06 LAB
ALBUMIN SERPL BCG-MCNC: 3.8 G/DL (ref 3.5–5)
ALP SERPL-CCNC: 68 U/L (ref 34–104)
ALT SERPL W P-5'-P-CCNC: 6 U/L (ref 7–52)
ANION GAP SERPL CALCULATED.3IONS-SCNC: 4 MMOL/L (ref 4–13)
AST SERPL W P-5'-P-CCNC: 10 U/L (ref 13–39)
ATRIAL RATE: 108 BPM
BASOPHILS # BLD MANUAL: 0 THOUSAND/UL (ref 0–0.1)
BASOPHILS NFR MAR MANUAL: 0 % (ref 0–1)
BILIRUB SERPL-MCNC: 0.28 MG/DL (ref 0.2–1)
BUN SERPL-MCNC: 17 MG/DL (ref 5–25)
CALCIUM SERPL-MCNC: 8.9 MG/DL (ref 8.4–10.2)
CHLORIDE SERPL-SCNC: 100 MMOL/L (ref 96–108)
CO2 SERPL-SCNC: 32 MMOL/L (ref 21–32)
CREAT SERPL-MCNC: 0.91 MG/DL (ref 0.6–1.3)
EOSINOPHIL # BLD MANUAL: 0 THOUSAND/UL (ref 0–0.4)
EOSINOPHIL NFR BLD MANUAL: 0 % (ref 0–6)
ERYTHROCYTE [DISTWIDTH] IN BLOOD BY AUTOMATED COUNT: 13.1 % (ref 11.6–15.1)
EST. AVERAGE GLUCOSE BLD GHB EST-MCNC: 131 MG/DL
GFR SERPL CREATININE-BSD FRML MDRD: 81 ML/MIN/1.73SQ M
GLUCOSE P FAST SERPL-MCNC: 147 MG/DL (ref 65–99)
GLUCOSE SERPL-MCNC: 147 MG/DL (ref 65–140)
GLUCOSE SERPL-MCNC: 153 MG/DL (ref 65–140)
GLUCOSE SERPL-MCNC: 159 MG/DL (ref 65–140)
GLUCOSE SERPL-MCNC: 177 MG/DL (ref 65–140)
GLUCOSE SERPL-MCNC: 199 MG/DL (ref 65–140)
HBA1C MFR BLD: 6.2 %
HCT VFR BLD AUTO: 41.1 % (ref 36.5–49.3)
HGB BLD-MCNC: 12.8 G/DL (ref 12–17)
HYPERCHROMIA BLD QL SMEAR: PRESENT
LYMPHOCYTES # BLD AUTO: 0.6 THOUSAND/UL (ref 0.6–4.47)
LYMPHOCYTES # BLD AUTO: 4 % (ref 14–44)
MAGNESIUM SERPL-MCNC: 2.4 MG/DL (ref 1.9–2.7)
MCH RBC QN AUTO: 29.3 PG (ref 26.8–34.3)
MCHC RBC AUTO-ENTMCNC: 31.1 G/DL (ref 31.4–37.4)
MCV RBC AUTO: 94 FL (ref 82–98)
MONOCYTES # BLD AUTO: 0.12 THOUSAND/UL (ref 0–1.22)
MONOCYTES NFR BLD: 1 % (ref 4–12)
NEUTROPHILS # BLD MANUAL: 11.22 THOUSAND/UL (ref 1.85–7.62)
NEUTS SEG NFR BLD AUTO: 94 % (ref 43–75)
P AXIS: 74 DEGREES
PHOSPHATE SERPL-MCNC: 3.5 MG/DL (ref 2.3–4.1)
PLATELET # BLD AUTO: 226 THOUSANDS/UL (ref 149–390)
PLATELET BLD QL SMEAR: ADEQUATE
PMV BLD AUTO: 9.9 FL (ref 8.9–12.7)
POLYCHROMASIA BLD QL SMEAR: PRESENT
POTASSIUM SERPL-SCNC: 4.7 MMOL/L (ref 3.5–5.3)
PR INTERVAL: 136 MS
PROT SERPL-MCNC: 7.2 G/DL (ref 6.4–8.4)
QRS AXIS: 270 DEGREES
QRSD INTERVAL: 84 MS
QT INTERVAL: 326 MS
QTC INTERVAL: 436 MS
RBC # BLD AUTO: 4.37 MILLION/UL (ref 3.88–5.62)
RBC MORPH BLD: PRESENT
SODIUM SERPL-SCNC: 136 MMOL/L (ref 135–147)
T WAVE AXIS: 74 DEGREES
VARIANT LYMPHS # BLD AUTO: 1 %
VENTRICULAR RATE: 108 BPM
WBC # BLD AUTO: 11.94 THOUSAND/UL (ref 4.31–10.16)
WBC TOXIC VACUOLES BLD QL SMEAR: PRESENT

## 2025-02-06 PROCEDURE — 82948 REAGENT STRIP/BLOOD GLUCOSE: CPT

## 2025-02-06 PROCEDURE — 99232 SBSQ HOSP IP/OBS MODERATE 35: CPT | Performed by: STUDENT IN AN ORGANIZED HEALTH CARE EDUCATION/TRAINING PROGRAM

## 2025-02-06 PROCEDURE — 94640 AIRWAY INHALATION TREATMENT: CPT

## 2025-02-06 PROCEDURE — 80053 COMPREHEN METABOLIC PANEL: CPT

## 2025-02-06 PROCEDURE — 85027 COMPLETE CBC AUTOMATED: CPT

## 2025-02-06 PROCEDURE — 84100 ASSAY OF PHOSPHORUS: CPT

## 2025-02-06 PROCEDURE — 83036 HEMOGLOBIN GLYCOSYLATED A1C: CPT

## 2025-02-06 PROCEDURE — 85007 BL SMEAR W/DIFF WBC COUNT: CPT

## 2025-02-06 PROCEDURE — 83735 ASSAY OF MAGNESIUM: CPT

## 2025-02-06 PROCEDURE — 99223 1ST HOSP IP/OBS HIGH 75: CPT | Performed by: INTERNAL MEDICINE

## 2025-02-06 PROCEDURE — 94760 N-INVAS EAR/PLS OXIMETRY 1: CPT

## 2025-02-06 RX ADMIN — ACETAMINOPHEN 325MG 650 MG: 325 TABLET ORAL at 21:33

## 2025-02-06 RX ADMIN — NICOTINE 14 MG: 14 PATCH, EXTENDED RELEASE TRANSDERMAL at 21:32

## 2025-02-06 RX ADMIN — GABAPENTIN 100 MG: 100 CAPSULE ORAL at 08:08

## 2025-02-06 RX ADMIN — IPRATROPIUM BROMIDE 0.5 MG: 0.5 SOLUTION RESPIRATORY (INHALATION) at 19:50

## 2025-02-06 RX ADMIN — DOXYCYCLINE 100 MG: 100 CAPSULE ORAL at 08:07

## 2025-02-06 RX ADMIN — LEVALBUTEROL HYDROCHLORIDE 1.25 MG: 1.25 SOLUTION RESPIRATORY (INHALATION) at 07:28

## 2025-02-06 RX ADMIN — GABAPENTIN 100 MG: 100 CAPSULE ORAL at 16:46

## 2025-02-06 RX ADMIN — INSULIN LISPRO 1 UNITS: 100 INJECTION, SOLUTION INTRAVENOUS; SUBCUTANEOUS at 21:34

## 2025-02-06 RX ADMIN — INSULIN LISPRO 1 UNITS: 100 INJECTION, SOLUTION INTRAVENOUS; SUBCUTANEOUS at 08:08

## 2025-02-06 RX ADMIN — PRAVASTATIN SODIUM 40 MG: 40 TABLET ORAL at 08:08

## 2025-02-06 RX ADMIN — HEPARIN SODIUM 5000 UNITS: 5000 INJECTION INTRAVENOUS; SUBCUTANEOUS at 16:43

## 2025-02-06 RX ADMIN — ASPIRIN 81 MG: 81 TABLET, CHEWABLE ORAL at 08:08

## 2025-02-06 RX ADMIN — METHYLPREDNISOLONE SODIUM SUCCINATE 40 MG: 40 INJECTION, POWDER, FOR SOLUTION INTRAMUSCULAR; INTRAVENOUS at 08:07

## 2025-02-06 RX ADMIN — Medication 6 MG: at 21:31

## 2025-02-06 RX ADMIN — INSULIN LISPRO 1 UNITS: 100 INJECTION, SOLUTION INTRAVENOUS; SUBCUTANEOUS at 16:44

## 2025-02-06 RX ADMIN — BUDESONIDE INHALATION 0.5 MG: 0.5 SUSPENSION RESPIRATORY (INHALATION) at 19:50

## 2025-02-06 RX ADMIN — IPRATROPIUM BROMIDE 0.5 MG: 0.5 SOLUTION RESPIRATORY (INHALATION) at 07:28

## 2025-02-06 RX ADMIN — LEVALBUTEROL HYDROCHLORIDE 1.25 MG: 1.25 SOLUTION RESPIRATORY (INHALATION) at 19:50

## 2025-02-06 RX ADMIN — HEPARIN SODIUM 5000 UNITS: 5000 INJECTION INTRAVENOUS; SUBCUTANEOUS at 21:31

## 2025-02-06 RX ADMIN — INSULIN LISPRO 2 UNITS: 100 INJECTION, SOLUTION INTRAVENOUS; SUBCUTANEOUS at 11:30

## 2025-02-06 RX ADMIN — LEVALBUTEROL HYDROCHLORIDE 1.25 MG: 1.25 SOLUTION RESPIRATORY (INHALATION) at 14:23

## 2025-02-06 RX ADMIN — AMLODIPINE BESYLATE 5 MG: 5 TABLET ORAL at 08:08

## 2025-02-06 RX ADMIN — HEPARIN SODIUM 5000 UNITS: 5000 INJECTION INTRAVENOUS; SUBCUTANEOUS at 05:37

## 2025-02-06 RX ADMIN — TAMSULOSIN HYDROCHLORIDE 0.4 MG: 0.4 CAPSULE ORAL at 08:08

## 2025-02-06 RX ADMIN — IPRATROPIUM BROMIDE 0.5 MG: 0.5 SOLUTION RESPIRATORY (INHALATION) at 14:23

## 2025-02-06 RX ADMIN — BUDESONIDE INHALATION 0.5 MG: 0.5 SUSPENSION RESPIRATORY (INHALATION) at 07:28

## 2025-02-06 RX ADMIN — DOXYCYCLINE 100 MG: 100 CAPSULE ORAL at 21:31

## 2025-02-06 NOTE — ASSESSMENT & PLAN NOTE
"Hold metformin  Sliding scale insulin  ACHS glucose checks  Diabetic diet  Hypoglycemia protocol  Check hemoglobin A1c    Lab Results   Component Value Date    HGBA1C 6.1 (H) 07/06/2022       No results for input(s): \"POCGLU\" in the last 72 hours.    Blood Sugar Average: Last 72 hrs:      "

## 2025-02-06 NOTE — PROGRESS NOTES
Progress Note - Hospitalist   Name: Ricki Shine 79 y.o. male I MRN: [id number]  Unit/Bed#: -01 I Date of Admission: 2/5/2025   Date of Service: 2/6/2025 I Hospital Day: 0    Assessment & Plan  COPD with acute exacerbation (HCC)  Patient is a 79-year-old male with past medical history of obesity, tobacco use, hypertension, hyperlipidemia, diabetes, and chronic hypoxic respiratory failure who presents to the hospital with abdominal/chest/back pain and shortness of breath.  He reported that he had right sided sharp/stabbing abdominal pain that radiated to his right chest and his right shoulder blade.  With interventions in the emergency room this pain has improved.  He was given doxycycline and prednisone along with nebulizers and was going to be discharged from the emergency room however his ambulatory sat dropped into the 70s.  Patient reports that his ambulatory sat will regularly dropping into the mid 80s at home.  He has been using his rescue inhaler more frequently so he will be admitted to the hospital for further management.  Pulmonology consulted, appreciate the assistance.  He does not seem to have a current pulmonologist that he follows with.  He uses a rescue inhaler only, no scheduled ICS/LABA  Still smokes, 1 pack/day.  Cessation advised and a strategy for removing the habit provided.  Nicotine patch offered.  On 2 L nasal cannula his oxygen saturation stable, at rest, at 94%  Keep saturation above 88  Xopenex/Atrovent/Pulmicort nebs  Twice daily Solu-Medrol  Continue doxycycline  Viral panel negative  Benign prostatic hyperplasia with nocturia  Continue Flomax  Primary hypertension  Blood pressure stable  Continue amlodipine 5 mg daily  Other hyperlipidemia  Continue pravastatin 40 mg daily  Type 2 diabetes mellitus with diabetic neuropathy, without long-term current use of insulin (HCC)  Hold metformin  Sliding scale insulin  ACHS glucose checks  Diabetic diet  Hypoglycemia protocol  Check  hemoglobin A1c    Lab Results   Component Value Date    HGBA1C 6.1 (H) 07/06/2022       Recent Labs     02/05/25  2308 02/06/25  0705   POCGLU 180* 153*       Blood Sugar Average: Last 72 hrs:  (P) 166.5    Infrarenal abdominal aortic aneurysm (AAA) without rupture (HCC)  Noted on CAT scan: 3.2 cm infrarenal abdominal aortic aneurysm.   Discussed with patient the importance of smoking cessation and how smoking increases possibility of rupture  Continue to monitor    VTE Pharmacologic Prophylaxis: VTE Score: 5 High Risk (Score >/= 5) - Pharmacological DVT Prophylaxis Ordered: heparin. Sequential Compression Devices Ordered.    Mobility:   Basic Mobility Inpatient Raw Score: 18  JH-HLM Goal: 6: Walk 10 steps or more  JH-HLM Achieved: 6: Walk 10 steps or more  JH-HLM Goal achieved. Continue to encourage appropriate mobility.    Patient Centered Rounds: I performed bedside rounds with nursing staff today.   Discussions with Specialists or Other Care Team Provider: Pulmonology, CM    Education and Discussions with Family / Patient: Patient declined call to .     Current Length of Stay: 0 day(s)  Current Patient Status: Observation   Certification Statement: The patient will continue to require additional inpatient hospital stay due to COPD exacerbation  Discharge Plan: Anticipate discharge in 24-48 hrs to TBD    Code Status: Level 3 - DNAR and DNI    Subjective   Patient seen and examined at bedside.  No acute events overnight.  Patient reports that he is continuing liters nasal cannula at baseline.  He does not follow with pulmonology in the outpatient setting.  He does admit to currently smoking 1 pack/day, reports that he removes his nasal cannula to smoke, and then uses it again.  Had extensive discussion with patient about how this is a very risky behavior, and that he should not be smoking at all when on supplemental oxygen even if he is removing it.  Patient strongly advised on smoking  cessation.    Objective :  Temp:  [97.7 °F (36.5 °C)-97.9 °F (36.6 °C)] 97.7 °F (36.5 °C)  HR:  [] 89  BP: (120-157)/(65-84) 120/73  Resp:  [17-22] 17  SpO2:  [91 %-97 %] 92 %  O2 Device: Nasal cannula  Nasal Cannula O2 Flow Rate (L/min):  [2 L/min] 2 L/min    Body mass index is 30.18 kg/m².     Input and Output Summary (last 24 hours):     Intake/Output Summary (Last 24 hours) at 2/6/2025 0858  Last data filed at 2/6/2025 0731  Gross per 24 hour   Intake 120 ml   Output 250 ml   Net -130 ml       Physical Exam  Constitutional:       General: He is not in acute distress.     Appearance: He is obese. He is not toxic-appearing.   HENT:      Head: Normocephalic and atraumatic.   Eyes:      Extraocular Movements: Extraocular movements intact.      Pupils: Pupils are equal, round, and reactive to light.   Cardiovascular:      Rate and Rhythm: Normal rate and regular rhythm.   Pulmonary:      Effort: Pulmonary effort is normal.      Comments: On 2 L nasal cannula, diminished breath sounds bilaterally  Abdominal:      Palpations: Abdomen is soft.   Musculoskeletal:         General: No swelling.      Right lower leg: No edema.      Left lower leg: No edema.   Skin:     General: Skin is warm.   Neurological:      General: No focal deficit present.      Mental Status: He is alert and oriented to person, place, and time. Mental status is at baseline.   Psychiatric:         Mood and Affect: Mood normal.         Behavior: Behavior normal.           Lines/Drains:              Lab Results: I have reviewed the following results:   Results from last 7 days   Lab Units 02/06/25  0503 02/05/25  1829   WBC Thousand/uL 11.94* 12.64*   HEMOGLOBIN g/dL 12.8 13.9   HEMATOCRIT % 41.1 44.5   PLATELETS Thousands/uL 226 232   SEGS PCT %  --  82*   LYMPHO PCT % 4* 7*   MONO PCT % 1* 9   EOS PCT % 0 2     Results from last 7 days   Lab Units 02/06/25  0503   SODIUM mmol/L 136   POTASSIUM mmol/L 4.7   CHLORIDE mmol/L 100   CO2 mmol/L 32    BUN mg/dL 17   CREATININE mg/dL 0.91   ANION GAP mmol/L 4   CALCIUM mg/dL 8.9   ALBUMIN g/dL 3.8   TOTAL BILIRUBIN mg/dL 0.28   ALK PHOS U/L 68   ALT U/L 6*   AST U/L 10*   GLUCOSE RANDOM mg/dL 147*         Results from last 7 days   Lab Units 02/06/25  0705 02/05/25  2308   POC GLUCOSE mg/dl 153* 180*         Results from last 7 days   Lab Units 02/05/25  1829   PROCALCITONIN ng/ml 0.13       Recent Cultures (last 7 days):               Last 24 Hours Medication List:     Current Facility-Administered Medications:     acetaminophen (TYLENOL) tablet 650 mg, Q6H PRN    aluminum-magnesium hydroxide-simethicone (MAALOX) oral suspension 30 mL, Q6H PRN    amLODIPine (NORVASC) tablet 5 mg, Daily    aspirin chewable tablet 81 mg, Daily    budesonide (PULMICORT) inhalation solution 0.5 mg, Q12H    doxycycline hyclate (VIBRAMYCIN) capsule 100 mg, Q12H    gabapentin (NEURONTIN) capsule 100 mg, BID    heparin (porcine) subcutaneous injection 5,000 Units, Q8H GISSELLE    influenza vaccine, high-dose (Fluzone High-Dose) IM injection 0.5 mL, Once    insulin lispro (HumALOG/ADMELOG) 100 units/mL subcutaneous injection 1-6 Units, 4x Daily (PC & HS)    ipratropium (ATROVENT) 0.02 % inhalation solution 0.5 mg, TID    levalbuterol (XOPENEX) inhalation solution 1.25 mg, TID **AND** [DISCONTINUED] sodium chloride 0.9 % inhalation solution 3 mL, TID    melatonin tablet 6 mg, HS    methylPREDNISolone sodium succinate (Solu-MEDROL) injection 40 mg, Q12H GISSELLE    nicotine (NICODERM CQ) 14 mg/24hr TD 24 hr patch 14 mg, Daily    pravastatin (PRAVACHOL) tablet 40 mg, Daily    tamsulosin (FLOMAX) capsule 0.4 mg, Daily    Administrative Statements   Today, Patient Was Seen By: Sera Bloom MD      **Please Note: This note may have been constructed using a voice recognition system.**

## 2025-02-06 NOTE — CONSULTS
Administrative Statements   VIRTUAL CARE DOCUMENTATION:     1. This service was provided via Telemedicine using [employer]     2. Parties in the room with patient during teleconsult Patient only    3. Confidentiality My office door was closed     4. Participants No one else was in the room    5. Patient acknowledged consent and understanding of privacy and security of the  Telemedicine consult. I informed the patient that I have reviewed their record in Epic and presented the opportunity for them to ask any questions regarding the visit today.  The patient agreed to participate.    6. I have spent a total time of 40 minutes in caring for this patient on the day of the visit/encounter including Diagnostic results, Instructions for management, Importance of tx compliance, Impressions, Documenting in the medical record, Reviewing / ordering tests, medicine, procedures  , and Obtaining or reviewing history  , not including the time spent for establishing the audio/video connection.   Pulmonary Consultation   Ricki Shine 79 y.o. male MRN: [id number]   -01 Encounter: [id number]      Reason for consultation:   Chronic obstructive pulmonary disease      Requesting physician:   Sera Dc MD      Impressions:   Chronic obstructive disease of at least moderate severity based on the PFTs done in 2021.  Severe centrilobular and paraseptal emphysema seen on the CT of the chest.  Chronic hypoxemic respiratory failure.  Ongoing tobacco use.  Nonspecific chest and back pain.  Resolved.    Recommendations:  Discontinue Solu-Medrol.  Continue ipratropium and Xopenex nebulizer treatments 3 times a day.  Consider Anoro Ellipta inhaler, 1 inhalation once a day on discharge..  Smoking cessation.  Oxygen supplement at 2 L.      History of Present Illness   HPI:  Ricki Shine is a 79 y.o. male who came to the emergency room because of back, abdominal and chest pain.  It has resolved.  The patient desaturated while he  was ambulating in the emergency room and for this reason he was admitted.  The patient has chronic obstructive pulmonary disease.  He has been on oxygen at 2 L/min flow for about 2 years.  He has chronic cough producing clear sputum.  It started about 2 months ago.  When I saw him this afternoon his breathing is at his baseline.    Review of systems:  12 point review of systems was completed and was otherwise negative except as listed in HPI.      Historical Information   Past Medical History:   Diagnosis Date    COPD (chronic obstructive pulmonary disease) (HCC)     Diabetes mellitus (HCC)     Hyperlipidemia     Hypertension      Past Surgical History:   Procedure Laterality Date    CHOLECYSTECTOMY      CORONARY ARTERY BYPASS GRAFT      HERNIA REPAIR       History reviewed. No pertinent family history.    Family History:  No family history of chronic lung disease.    Social History:  The patient lives by himself.  He was a pipe laying.  Denied exposure to asbestos.  He has about 65-pack-year smoking history and continues to smoke a pack a day.      Meds/Allergies     Current Facility-Administered Medications:     acetaminophen (TYLENOL) tablet 650 mg, Q6H PRN    aluminum-magnesium hydroxide-simethicone (MAALOX) oral suspension 30 mL, Q6H PRN    amLODIPine (NORVASC) tablet 5 mg, Daily    aspirin chewable tablet 81 mg, Daily    budesonide (PULMICORT) inhalation solution 0.5 mg, Q12H    doxycycline hyclate (VIBRAMYCIN) capsule 100 mg, Q12H    gabapentin (NEURONTIN) capsule 100 mg, BID    heparin (porcine) subcutaneous injection 5,000 Units, Q8H GISSELLE    influenza vaccine, high-dose (Fluzone High-Dose) IM injection 0.5 mL, Once    insulin lispro (HumALOG/ADMELOG) 100 units/mL subcutaneous injection 1-6 Units, 4x Daily (PC & HS)    ipratropium (ATROVENT) 0.02 % inhalation solution 0.5 mg, TID    levalbuterol (XOPENEX) inhalation solution 1.25 mg, TID **AND** [DISCONTINUED] sodium chloride 0.9 % inhalation solution 3 mL,  "TID    melatonin tablet 6 mg, HS    nicotine (NICODERM CQ) 14 mg/24hr TD 24 hr patch 14 mg, Daily    pravastatin (PRAVACHOL) tablet 40 mg, Daily    tamsulosin (FLOMAX) capsule 0.4 mg, Daily    Medications Prior to Admission:     amLODIPine (NORVASC) 5 mg tablet    aspirin 81 mg chewable tablet    ergocalciferol (VITAMIN D2) 50,000 units    gabapentin (NEURONTIN) 100 mg capsule    Lancets (OneTouch Delica Plus [id number]) MISC    metFORMIN (GLUCOPHAGE) 500 mg tablet    OneTouch Ultra test strip    pravastatin (PRAVACHOL) 40 mg tablet    tamsulosin (FLOMAX) 0.4 mg  No Known Allergies    Vitals: Blood pressure 124/64, pulse 98, temperature (!) 97.3 °F (36.3 °C), resp. rate 18, height 5' 6\" (1.676 m), weight 84.8 kg (187 lb), SpO2 90%.,      Intake/Output Summary (Last 24 hours) at 2/6/2025 1630  Last data filed at 2/6/2025 1300  Gross per 24 hour   Intake 360 ml   Output 250 ml   Net 110 ml       Physical exam:  The video exam showed the patient with nasal cannula.  Comfortable.  Had no cough.    Exam based on the records from the progress note today.        Head/eyes:    Normocephalic, without obvious abnormality, atraumatic,         PERRL, extraocular muscles intact, no scleral icterus    Nose:   Nares normal, septum midline, mucosa normal, no drainage    or sinus tenderness   Throat:   Moist mucous membranes, no thrush   Neck:   Supple, trachea midline, no adenopathy; no carotid    bruit or JVD   Lungs:   Normal breathing effort        Heart:    Regular rate and rhythm, S1 and S2 normal, no murmur, rub   or gallop   Abdomen:     Soft, non-tender, bowel sounds active all four quadrants,     no masses, no organomegaly   Extremities:   Extremities normal, atraumatic, no cyanosis or edema   Skin:   Warm, dry, turgor normal, no rashes or lesions   Neurologic:   CNII-XII intact, normal strength, non-focal             Labs: CBC:   Lab Results   Component Value Date    WBC 11.94 (H) 02/06/2025    HGB 12.8 02/06/2025    HCT " 41.1 02/06/2025    MCV 94 02/06/2025     02/06/2025    RBC 4.37 02/06/2025    MCH 29.3 02/06/2025    MCHC 31.1 (L) 02/06/2025    RDW 13.1 02/06/2025    MPV 9.9 02/06/2025    NRBC 0 02/05/2025   , CMP:   Lab Results   Component Value Date    SODIUM 136 02/06/2025    K 4.7 02/06/2025     02/06/2025    CO2 32 02/06/2025    BUN 17 02/06/2025    CREATININE 0.91 02/06/2025    CALCIUM 8.9 02/06/2025    AST 10 (L) 02/06/2025    ALT 6 (L) 02/06/2025    ALKPHOS 68 02/06/2025    EGFR 81 02/06/2025       Imaging and other studies: Results Review Statement: I personally reviewed the following image studies in PACS and associated radiology reports: CT chest. My interpretation of the radiology images/reports is: Extensive centrilobular and paraseptal emphysema predominantly in the upper lobes..    PFTs from 2021 is reviewed in Care Everywhere.  He had moderate airflow limitation.    Zahraa Nowak MD

## 2025-02-06 NOTE — CASE MANAGEMENT
Case Management Assessment & Discharge Planning Note    Patient name Ricki Shine  Location /-01 MRN [id number]  : 1945 Date 2025       Current Admission Date: 2025  Current Admission Diagnosis:COPD with acute exacerbation (HCC)   Patient Active Problem List    Diagnosis Date Noted Date Diagnosed    Primary hypertension 2025     Other hyperlipidemia 2025     Type 2 diabetes mellitus with diabetic neuropathy, without long-term current use of insulin (HCC) 2025     COPD with acute exacerbation (HCC) 2025     Infrarenal abdominal aortic aneurysm (AAA) without rupture (HCC) 2025     History of gross hematuria 2024     Benign prostatic hyperplasia with nocturia 2024     Phimosis 2023     Bladder wall thickening 2023     Urinary frequency 2023     Acute right flank pain 2023     Gross hematuria 2023       LOS (days): 0  Geometric Mean LOS (GMLOS) (days):   Days to GMLOS:     OBJECTIVE:              Current admission status: Observation       Preferred Pharmacy:   [id number] - [place], [place] - [address]  [address]  [place] [address]  Phone: [phone] Fax: [phone]    Primary Care Provider: Jadyn Donald DO    Primary Insurance: [employer]  Secondary Insurance:     CM met with patient at the bedside,baseline information  was obtained. CM discussed the role of CM in helping the patient develop a discharge plan and assist the patient in carry out their plan.      ASSESSMENT:  Active Health Care Proxies    There are no active Health Care Proxies on file.       Advance Directives  Does patient have a Health Care POA?: No  Was patient offered paperwork?: Yes (declined)  Does patient currently have a Health Care decision maker?: Yes, please see Health Care Proxy section  Does patient have Advance Directives?: No  Was patient offered paperwork?: Yes (declined)  Primary  Contact: Halina Toro (Daughter)  [phone]         Readmission Root Cause  30 Day Readmission: No    Patient Information  Admitted from:: Home  Mental Status: Alert  During Assessment patient was accompanied by: Not accompanied during assessment  Assessment information provided by:: Patient  Primary Caregiver: Self  Support Systems: Friend, Family members  County of Residence: [facility]  What city do you live in?: [place]  Home entry access options. Select all that apply.: Stairs (2 sets of steps-- 14 steps and 13 more)  Number of steps to enter home.:  (total of 27 steps, 2 set of steps)  Type of Current Residence: [address] [place] [address] home  Upon entering residence, is there a bedroom on the main floor (no further steps)?: No  A bedroom is located on the following floor levels of residence (select all that apply):: 2nd Floor  Upon entering residence, is there a bathroom on the main floor (no further steps)?: No (has a commode on the 1st floor)  Indicate which floors of current residence have a bathroom (select all the apply):: 2nd Floor  Number of steps to 2nd floor from main floor: One Flight  Living Arrangements: Lives Alone  Is patient a ?: No    Activities of Daily Living Prior to Admission  Functional Status: Independent  Completes ADLs independently?: Yes  Ambulates independently?: Yes  Does patient use assisted devices?: Yes  Assisted Devices (DME) used: Home Oxygen concentrator, Portable Oxygen concentrator, Nebulizer  DME Company Name (respiratory supplies): ? [employer]  O2 Rate(s): 2 liters of 02 continuously  Does patient currently own DME?: Yes  What DME does the patient currently own?: Portable Oxygen concentrator  Does patient have a history of Outpatient Therapy (PT/OT)?: No  Does the patient have a history of Short-Term Rehab?: No  Does patient have a history of HHC?: No  Does patient currently have HHC?: No         Patient Information Continued  Income Source: Pension/jail  Does patient have  prescription coverage?: Yes  Does patient receive dialysis treatments?: No  Does patient have a history of Mental Health Diagnosis?: No         Means of Transportation  Means of Transport to [facility]:: Drives Self    Pt resides alone, has a nebulizer and uses continuous 02.  Has 5 adult children.      DISCHARGE DETAILS:    Discharge planning discussed with:: patient  Freedom of Choice: Yes     CM contacted family/caregiver?: No- see comments           Treatment Team Recommendation: Home  Discharge Destination Plan:: Home  Transport at Discharge : Family         No needs are identified at the time of the initial assessment,  care manager will respond upon request or reassess patient for discharge needs as appropriate.

## 2025-02-06 NOTE — UTILIZATION REVIEW
Initial Clinical Review    WAS OBSERVATION 2/5/25 @ 2012 CONVERTED TO INPATIENT ADMISSION 2/6/25 @ 1445 DUE TO CONTINUED STAY REQUIRED TO CARE FOR PATIENT WITH DX: COPD EXACERBATION.    Admission: Date/Time/Statement:   Admission Orders (From admission, onward)       Ordered        02/06/25 1445  INPATIENT ADMISSION  Once            02/05/25 2012  Place in Observation  Once                          Orders Placed This Encounter   Procedures    INPATIENT ADMISSION     Standing Status:   Standing     Number of Occurrences:   1     Level of Care:   Med Surg [16]     Estimated length of stay:   More than 2 Midnights     Certification:   I certify that inpatient services are medically necessary for this patient for a duration of greater than two midnights. See H&P and MD Progress Notes for additional information about the patient's course of treatment.     ED Arrival Information       Expected   -    Arrival   2/5/2025 18:04    Acuity   Emergent              Means of arrival   Walk-In    Escorted by   Family Member    Service   Hospitalist    Admission type   Emergency              Arrival complaint   Chest pain/sob / abdominal pain             Chief Complaint   Patient presents with    Epigastric Pain     Patient states since waking up today he has had abdominal pain that radiates up into his chest. Also reports he is more short of breath than normal with mid back pain. Wears 2LNC at baseline.        Initial Presentation: 79 y.o. male to ED via walk-in from home  Present to ED with abdominal/chest/back pain and shortness of breath. Endorses he had right sided sharp/stabbing abdominal pain that radiated to his right chest and his right shoulder blade. his ambulatory sat dropped into the 70s. Patient reports that his ambulatory sat will regularly dropping into the mid 80s at home.   PMHX COPD, obesity, tobacco use, hypertension, hyperlipidemia, diabetes   Admitted to OBS with DX: COPD with acute exacerbation   on exam:  tachy; tachpnea; lungs with wheezing; WBC 12.64  PLAN: cont DuoNebs; Accuchecks with ssic; monitor labs; pulmonary consult; continuous pulse ox; f/u Heme A1c      Anticipated Length of Stay/Certification Statement: Patient will be admitted on an observation basis with an anticipated length of stay of less than 2 midnights secondary to pulmonology evaluation.       Date: 2/6/25 - CHANGED TO INPATIENT   Patient endorses using O2 @ home. On 2 L nasal cannula, diminished breath sounds bilaterally; WBC 11.94;   Viral panel negative   Plan:  cont po doxy; cont DuoNebs; Accuchecks with ssic; monitor labs; pulmonary consult; continuous pulse ox; f/u Heme A1c      Date: 2/7/25     Day 3: Has surpassed a 2nd midnight with active treatments and services.  Discharge Summary    Hospital Course:   Ricki Shine is a 79 y.o. male patient who originally presented to the hospital on 2/5/2025 due to shortness of breath, with associated abdominal/chest/back pain.  On admission it was noted that patient was in COPD exacerbation, and was given doxycycline, prednisone, and nebulizer treatment with improvement in symptoms.  Patient was supposed to be discharged from the emergency room, however ambulatory sat dropped into the 70s, therefore patient was admitted to the hospital.  While hospitalized patient's condition improved, he remained on his O2 baseline 2 L nasal cannula, and maintain saturation with ambulation.  He does report that he does not follow with pulmonology in the outpatient setting, pulmonology was consulted.  They will be setting up an outpatient follow-up for the patient.  Patient was started on Anoro on discharge, and prednisone 20 mg send 7 days based on pulmonology recommendations.  Patient started on IV steroids and nebulizer treatment while hospitalized. Patient received 2 days of doxycycline therapy while hospitalized.  Recommended close outpatient pulmonology follow-up and pulmonary rehab referral was provided on  discharge. Discussed with patient extensively on the importance of smoking cessation. Patient was medically stable  at the time of discharge.     Disposition: Home / self      ED Treatment-Medication Administration from 02/05/2025 1803 to 02/05/2025 2058         Date/Time Order Dose Route Action     02/05/2025 1830 ipratropium-albuterol (DUO-NEB) 0.5-2.5 mg/3 mL inhalation solution 3 mL 3 mL Nebulization Given     02/05/2025 1855 iohexol (OMNIPAQUE) 350 MG/ML injection (MULTI-DOSE) 60 mL 60 mL Intravenous Given     02/05/2025 2006 doxycycline hyclate (VIBRAMYCIN) capsule 100 mg 100 mg Oral Given     02/05/2025 2006 predniSONE tablet 40 mg 40 mg Oral Given     02/05/2025 2029 ipratropium-albuterol (DUO-NEB) 0.5-2.5 mg/3 mL inhalation solution 3 mL 3 mL Nebulization Given            Scheduled Medications:  amLODIPine, 5 mg, Oral, Daily  aspirin, 81 mg, Oral, Daily  budesonide, 0.5 mg, Nebulization, Q12H  doxycycline hyclate, 100 mg, Oral, Q12H  gabapentin, 100 mg, Oral, BID  heparin (porcine), 5,000 Units, Subcutaneous, Q8H GISSELLE  influenza vaccine, 0.5 mL, Intramuscular, Once  insulin lispro, 1-6 Units, Subcutaneous, 4x Daily (PC & HS)  ipratropium, 0.5 mg, Nebulization, TID  levalbuterol, 1.25 mg, Nebulization, TID  melatonin, 6 mg, Oral, HS  methylPREDNISolone sodium succinate, 40 mg, Intravenous, Q12H GISSELLE (started 2/6)   nicotine, 14 mg, Transdermal, Daily  pravastatin, 40 mg, Oral, Daily  tamsulosin, 0.4 mg, Oral, Daily      Continuous IV Infusions: None       PRN Meds:  acetaminophen, 650 mg, Oral, Q6H PRN  aluminum-magnesium hydroxide-simethicone, 30 mL, Oral, Q6H PRN      ED Triage Vitals [02/05/25 1814]   Temperature Pulse Respirations Blood Pressure SpO2 Pain Score   97.9 °F (36.6 °C) (!) 112 20 157/73 91 % No Pain     Weight (last 2 days) before discharge       Date/Time Weight    02/05/25 21:23:28 84.8 (187)    02/05/25 1814 84.9 (187.17)            Vital Signs (last 3 days) before discharge       Date/Time  Temp Pulse Resp BP MAP (mmHg) SpO2 Calculated FIO2 (%) - Nasal Cannula Nasal Cannula O2 Flow Rate (L/min) O2 Device Patient Position - Orthostatic VS Pain    02/07/25 0906 -- -- -- -- -- -- 28 2 L/min Nasal cannula -- --    02/07/25 0834 -- -- -- -- -- -- -- -- -- -- 3    02/07/25 07:35:04 97.3 °F (36.3 °C) 81 18 143/81 102 96 % -- -- -- -- --    02/07/25 0718 -- -- -- -- -- 94 % 28 2 L/min Nasal cannula -- --    02/06/25 22:16:17 97.5 °F (36.4 °C) -- -- 125/68 87 -- -- -- -- -- --    02/06/25 2133 -- -- -- -- -- -- -- -- -- -- 4    02/06/25 2100 -- -- -- -- -- 90 % 28 2 L/min Nasal cannula -- --    02/06/25 1952 -- -- -- -- -- 92 % -- -- -- -- --    02/06/25 1538 -- -- -- -- -- 90 % -- -- -- -- --    02/06/25 14:33:35 97.3 °F (36.3 °C) 98 18 124/64 84 95 % -- -- -- -- --    02/06/25 1424 -- -- -- -- -- 95 % 28 2 L/min Nasal cannula -- --    02/06/25 0800 -- -- -- -- -- 92 % 28 2 L/min Nasal cannula -- No Pain    02/06/25 0731 -- -- -- -- -- 92 % 28 2 L/min -- -- --    02/06/25 07:10:39 -- 89 17 120/73 89 91 % -- -- -- -- --    02/05/25 21:23:28 97.7 °F (36.5 °C) 113 18 130/84 99 91 % 28 2 L/min Nasal cannula -- --    02/05/25 2102 -- -- -- -- -- -- -- -- -- -- No Pain    02/05/25 2030 -- 113 18 139/79 101 96 % 28 2 L/min Nasal cannula Sitting --    02/05/25 1930 -- 99 22 144/65 93 97 % 28 2 L/min Nasal cannula Sitting --    02/05/25 1900 -- 104 22 156/72 104 96 % 28 2 L/min Nasal cannula Sitting --    02/05/25 1814 97.9 °F (36.6 °C) 112 20 157/73 -- 91 % 28 2 L/min Nasal cannula Lying No Pain              Pertinent Labs/Diagnostic Test Results:   Radiology:  CTA dissection protocol chest/abdomen/pelvis   Final Interpretation by Jose Antonio Mcdonald MD (02/05 1942)      No acute aortic/arterial abnormality.   Diffuse severe atherosclerotic disease as described above.      3.2 cm infrarenal abdominal aortic aneurysm.   Recommend follow-up CT versus ultrasound in 3 years.      Pleural-based 1 cm right lower lobe  nodule measuring 1 cm, slightly increased from October 2023 prior.   Recommend follow-up chest CT in 6 months to evaluate for interval change.      No acute abdominopelvic abnormality.      The study was marked in EPIC for immediate notification.            Workstation performed: [id number]             Results from last 7 days   Lab Units 02/05/25 2029   SARS-COV-2  Negative     Results from last 7 days   Lab Units 02/07/25  0456 02/06/25  0503 02/05/25  1829   WBC Thousand/uL 15.39* 11.94* 12.64*   HEMOGLOBIN g/dL 12.5 12.8 13.9   HEMATOCRIT % 40.6 41.1 44.5   PLATELETS Thousands/uL 227 226 232   TOTAL NEUT ABS Thousands/µL 13.00*  --  10.25*        Results from last 7 days   Lab Units 02/07/25 0456 02/06/25  0503 02/05/25  1829   SODIUM mmol/L 137 136 137   POTASSIUM mmol/L 4.3 4.7 4.5   CHLORIDE mmol/L 101 100 99   CO2 mmol/L 30 32 31   ANION GAP mmol/L 6 4 7   BUN mg/dL 24 17 16   CREATININE mg/dL 0.97 0.91 1.12   EGFR ml/min/1.73sq m 74 81 63   CALCIUM mg/dL 9.1 8.9 9.3   MAGNESIUM mg/dL  --  2.4 2.1   PHOSPHORUS mg/dL  --  3.5  --      Results from last 7 days   Lab Units 02/06/25  0503 02/05/25  1829   AST U/L 10* 14   ALT U/L 6* 7   ALK PHOS U/L 68 75   TOTAL PROTEIN g/dL 7.2 7.6   ALBUMIN g/dL 3.8 4.1   TOTAL BILIRUBIN mg/dL 0.28 0.39     Results from last 7 days   Lab Units 02/07/25  1042 02/07/25  0733 02/06/25 2052 02/06/25  1543 02/06/25  1053 02/06/25  0705 02/05/25  2308   POC GLUCOSE mg/dl 100 109 159* 177* 199* 153* 180*     Results from last 7 days   Lab Units 02/07/25  0456 02/06/25  0503 02/05/25  1829   GLUCOSE RANDOM mg/dL 118 147* 128        Results from last 7 days   Lab Units 02/05/25 2029 02/05/25  1829   HS TNI 0HR ng/L  --  8   HS TNI 2HR ng/L 8  --    HSTNI D2 ng/L 0  --         Results from last 7 days   Lab Units 02/05/25  1829   PROCALCITONIN ng/ml 0.13        Results from last 7 days   Lab Units 02/05/25  1829   BNP pg/mL 91        Results from last 7 days   Lab Units  02/05/25 2029   INFLUENZA A PCR  Negative   INFLUENZA B PCR  Negative   RSV PCR  Negative        Past Medical History:   Diagnosis Date    COPD (chronic obstructive pulmonary disease) (HCC)     Diabetes mellitus (HCC)     Hyperlipidemia     Hypertension      Present on Admission:   Benign prostatic hyperplasia with nocturia      Admitting Diagnosis: Tobacco use disorder [F17.200]  Epigastric pain [R10.13]  Abdominal pain [R10.9]  Aortic aneurysm (HCC) [I71.9]  Pulmonary nodule [R91.1]  COPD with acute exacerbation (HCC) [J44.1]  Age/Sex: 79 y.o. male    Network Utilization Review Department  ATTENTION: Please call with any questions or concerns to [phone] and carefully listen to the prompts so that you are directed to the right person. All voicemails are confidential.   For Discharge needs, contact Care Management DC Support Team at [phone] opt. [phone]  Send all requests for admission clinical reviews, approved or denied determinations and any other requests to dedicated fax number below belonging to the campus where the patient is receiving treatment. List of dedicated fax numbers for the Facilities:  FACILITY NAME UR FAX NUMBER   ADMISSION DENIALS (Administrative/Medical Necessity) [phone]   DISCHARGE SUPPORT TEAM (NETWORK) [phone]   PARENT CHILD HEALTH (Maternity/NICU/Pediatrics) [phone]   [facility] [phone]   [facility] [phone]   [facility] [phone]   [facility] [phone]   [facility] [phone]   [facility] [phone]   [facility] [phone]   [facility] [phone]   [facility] [phone]   [facility] [phone]   [facility] [phone]   [facility]  [facility] [phone]

## 2025-02-06 NOTE — ED NOTES
Pt attempted ambulatory trial with this RN on chronic 2 L NC. Pt noted to drop to 74% during trial with dyspnea. Provider made aware.     Yaquelin Neil RN  02/05/25 2042

## 2025-02-06 NOTE — H&P
H&P - Hospitalist   Name: Ricki Shine 79 y.o. male I MRN: [id number]  Unit/Bed#: -01 I Date of Admission: 2/5/2025   Date of Service: 2/5/2025 I Hospital Day: 0     Assessment & Plan  COPD with acute exacerbation (HCC)  Patient is a 79-year-old male with past medical history of obesity, tobacco use, hypertension, hyperlipidemia, diabetes, and chronic hypoxic respiratory failure who presents to the hospital with abdominal/chest/back pain and shortness of breath.  He reported that he had right sided sharp/stabbing abdominal pain that radiated to his right chest and his right shoulder blade.  With interventions in the emergency room this pain has improved.  He was given doxycycline and prednisone along with nebulizers and was going to be discharged from the emergency room however his ambulatory sat dropped into the 70s.  Patient reports that his ambulatory sat will regularly dropping into the mid 80s at home.  He has been using his rescue inhaler more frequently so he will be admitted to the hospital for further management.  Pulmonology consulted, appreciate the assistance.  He does not seem to have a current pulmonologist that he follows with.  He uses a rescue inhaler only, no scheduled ICS/LABA  Still smokes, 1 pack/day.  Cessation advised and a strategy for removing the habit provided.  Nicotine patch offered.  On 2 L nasal cannula his oxygen saturation stable, at rest, at 94%  Keep saturation above 88  Xopenex/Atrovent/Pulmicort nebs  Twice daily Solu-Medrol  Continue doxycycline  Viral panel negative  Benign prostatic hyperplasia with nocturia  Continue Flomax  Primary hypertension  Blood pressure stable  Continue amlodipine 5 mg daily  Other hyperlipidemia  Continue pravastatin 40 mg daily  Type 2 diabetes mellitus with diabetic neuropathy, without long-term current use of insulin (HCC)  Hold metformin  Sliding scale insulin  ACHS glucose checks  Diabetic diet  Hypoglycemia protocol  Check hemoglobin  "A1c    Lab Results   Component Value Date    HGBA1C 6.1 (H) 07/06/2022       No results for input(s): \"POCGLU\" in the last 72 hours.    Blood Sugar Average: Last 72 hrs:      Infrarenal abdominal aortic aneurysm (AAA) without rupture (HCC)  Noted on CAT scan: 3.2 cm infrarenal abdominal aortic aneurysm.   Discussed with patient the importance of smoking cessation and how smoking increases possibility of rupture  Continue to monitor      VTE Pharmacologic Prophylaxis: VTE Score: 5 High Risk (Score >/= 5) - Pharmacological DVT Prophylaxis Ordered: heparin. Sequential Compression Devices Ordered.  Code Status: Level 3 - DNAR and DNI   Discussion with patient    Anticipated Length of Stay: Patient will be admitted on an observation basis with an anticipated length of stay of less than 2 midnights secondary to pulmonology evaluation.    History of Present Illness   Chief Complaint: Abdominal/back/chest pain    Ricki Shine is a 79-year-old male with past medical history of obesity, tobacco use, hypertension, hyperlipidemia, diabetes, and chronic hypoxic respiratory failure who presents to the hospital with abdominal/chest/back pain and shortness of breath.  He reported that he had right sided sharp/stabbing abdominal pain that radiated to his right chest and his right shoulder blade.  With interventions in the emergency room this pain has improved.  He was given doxycycline and prednisone along with nebulizers and was going to be discharged from the emergency room however his ambulatory sat dropped into the 70s.  Patient reports that his ambulatory sat will regularly dropping into the mid 80s at home.  He has been using his rescue inhaler more frequently so he will be admitted to the hospital for further management.     Review of Systems   Constitutional:  Negative for chills and fever.   HENT:  Negative for ear pain and sore throat.    Eyes:  Negative for pain and visual disturbance.   Respiratory:  Positive for " shortness of breath. Negative for cough.    Cardiovascular:  Negative for chest pain and palpitations.   Gastrointestinal:  Negative for abdominal pain and vomiting.   Genitourinary:  Negative for dysuria and hematuria.   Musculoskeletal:  Negative for arthralgias and back pain.   Skin:  Negative for color change and rash.   Neurological:  Negative for seizures and syncope.   All other systems reviewed and are negative.      Historical Information   Past Medical History:   Diagnosis Date    COPD (chronic obstructive pulmonary disease) (HCC)     Diabetes mellitus (HCC)     Hyperlipidemia     Hypertension      Past Surgical History:   Procedure Laterality Date    CHOLECYSTECTOMY      CORONARY ARTERY BYPASS GRAFT      HERNIA REPAIR       Social History     Tobacco Use    Smoking status: Former     Current packs/day: 1.00     Types: Cigarettes    Smokeless tobacco: Never   Vaping Use    Vaping status: Never Used   Substance and Sexual Activity    Alcohol use: Yes     Comment: Socially    Drug use: Never    Sexual activity: Not on file     E-Cigarette/Vaping    E-Cigarette Use Never User      E-Cigarette/Vaping Substances     History reviewed. No pertinent family history.  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  Prior to Admission medications    Medication Sig Start Date End Date Taking? Authorizing Provider   amLODIPine (NORVASC) 5 mg tablet Take 5 mg by mouth daily 6/25/23  Yes Historical Provider, MD   aspirin 81 mg chewable tablet Chew 81 mg daily   Yes Historical Provider, MD   doxycycline hyclate (VIBRAMYCIN) 100 mg capsule Take 1 capsule (100 mg total) by mouth 2 (two) times a day for 7 days 2/5/25  2/12/25 Yes Pete Willard DO   ergocalciferol (VITAMIN D2) 50,000 units TAKE ONE TABLET BY MOUTH ONE TIME A MONTH 6/25/23  Yes Historical Provider, MD   gabapentin (NEURONTIN) 100 mg capsule Take 100 mg by mouth 2 (two) times a day 6/19/23  Yes Historical Provider, MD   ipratropium-albuterol (DUO-NEB) 0.5-2.5 mg/3 mL nebulizer solution Take 3 mL by nebulization every 6 (six) hours as needed for wheezing or shortness of breath 2/5/25  Yes Pete Willard,    Lancets (OneTouch Delica Plus [id number]) MISC USE 1 LANCET TO CHECK GLUCOSE TWICE DAILY 11/2/23  Yes Historical Provider, MD   metFORMIN (GLUCOPHAGE) 500 mg tablet Take 500 mg by mouth daily 7/31/23  Yes Historical Provider, MD   OneTouch Ultra test strip 2 (two) times a day Test blood sugar 10/31/23  Yes Historical Provider, MD   pravastatin (PRAVACHOL) 40 mg tablet Take 40 mg by mouth daily 8/29/23  Yes Historical Provider, MD   predniSONE 20 mg tablet Take 1 tablet (20 mg total) by mouth daily for 7 days 2/5/25 2/12/25 Yes Pete Willard DO   tamsulosin (FLOMAX) 0.4 mg Take 0.4 mg by mouth daily 6/20/23  Yes Historical Provider, MD   phenazopyridine (PYRIDIUM) 200 mg tablet Take 1 tablet (200 mg total) by mouth 3 (three) times a day  Patient not taking: Reported on 2/5/2025 10/20/23 2/5/25  Iva Acosta DO     No Known Allergies    Objective :  Temp:  [97.7 °F (36.5 °C)-97.9 °F (36.6 °C)] 97.7 °F (36.5 °C)  HR:  [] 113  BP: (130-157)/(65-84) 130/84  Resp:  [18-22] 18  SpO2:  [91 %-97 %] 91 %  O2 Device: Nasal cannula  Nasal Cannula O2 Flow Rate (L/min):  [2 L/min] 2 L/min    Physical Exam  Vitals and nursing note reviewed.   Constitutional:       General: He is not in acute distress.     Appearance: He is well-developed.   HENT:      Head: Normocephalic and atraumatic.   Eyes:      Conjunctiva/sclera: Conjunctivae normal.   Cardiovascular:      Rate and Rhythm: Normal rate and regular rhythm.      Heart sounds: No murmur heard.  Pulmonary:       Effort: Pulmonary effort is normal. No respiratory distress.      Breath sounds: Wheezing present.      Comments: Right lower lobe expiratory wheezes  Abdominal:      Palpations: Abdomen is soft.      Tenderness: There is no abdominal tenderness.   Musculoskeletal:         General: No swelling.      Cervical back: Neck supple.   Skin:     General: Skin is warm and dry.      Capillary Refill: Capillary refill takes less than 2 seconds.   Neurological:      Mental Status: He is alert.   Psychiatric:         Mood and Affect: Mood normal.          Lines/Drains:            Lab Results: I have reviewed the following results:  Results from last 7 days   Lab Units 02/05/25  1829   WBC Thousand/uL 12.64*   HEMOGLOBIN g/dL 13.9   HEMATOCRIT % 44.5   PLATELETS Thousands/uL 232   SEGS PCT % 82*   LYMPHO PCT % 7*   MONO PCT % 9   EOS PCT % 2     Results from last 7 days   Lab Units 02/05/25  1829   SODIUM mmol/L 137   POTASSIUM mmol/L 4.5   CHLORIDE mmol/L 99   CO2 mmol/L 31   BUN mg/dL 16   CREATININE mg/dL 1.12   ANION GAP mmol/L 7   CALCIUM mg/dL 9.3   ALBUMIN g/dL 4.1   TOTAL BILIRUBIN mg/dL 0.39   ALK PHOS U/L 75   ALT U/L 7   AST U/L 14   GLUCOSE RANDOM mg/dL 128             Lab Results   Component Value Date    HGBA1C 6.1 (H) 07/06/2022     Results from last 7 days   Lab Units 02/05/25  1829   PROCALCITONIN ng/ml 0.13       Imaging Results Review: I reviewed radiology reports from this admission including: CT chest and CT abdomen/pelvis.  Other Study Results Review: EKG was reviewed.     ** Please Note: This note has been constructed using a voice recognition system. **

## 2025-02-06 NOTE — PLAN OF CARE
Problem: PAIN - ADULT  Goal: Verbalizes/displays adequate comfort level or baseline comfort level  Description: Interventions:  - Encourage patient to monitor pain and request assistance  - Assess pain using appropriate pain scale  - Administer analgesics based on type and severity of pain and evaluate response  - Implement non-pharmacological measures as appropriate and evaluate response  - Consider cultural and social influences on pain and pain management  - Notify physician/advanced practitioner if interventions unsuccessful or patient reports new pain  Outcome: Progressing     Problem: INFECTION - ADULT  Goal: Absence or prevention of progression during hospitalization  Description: INTERVENTIONS:  - Assess and monitor for signs and symptoms of infection  - Monitor lab/diagnostic results  - Monitor all insertion sites, i.e. indwelling lines, tubes, and drains  - Monitor endotracheal if appropriate and nasal secretions for changes in amount and color  - [place] appropriate cooling/warming therapies per order  - Administer medications as ordered  - Instruct and encourage patient and family to use good hand hygiene technique  - Identify and instruct in appropriate isolation precautions for identified infection/condition  Outcome: Progressing  Goal: Absence of fever/infection during neutropenic period  Description: INTERVENTIONS:  - Monitor WBC    Outcome: Progressing     Problem: SAFETY ADULT  Goal: Patient will remain free of falls  Description: INTERVENTIONS:  - Educate patient/family on patient safety including physical limitations  - Instruct patient to call for assistance with activity   - Consult OT/PT to assist with strengthening/mobility   - Keep Call bell within reach  - Keep bed low and locked with side rails adjusted as appropriate  - Keep care items and personal belongings within reach  - Initiate and maintain comfort rounds  - Make Fall Risk Sign visible to staff  - Apply yellow socks and bracelet  for high fall risk patients  - Consider moving patient to room near nurses station  Outcome: Progressing  Goal: Maintain or return to baseline ADL function  Description: INTERVENTIONS:  -  Assess patient's ability to carry out ADLs; assess patient's baseline for ADL function and identify physical deficits which impact ability to perform ADLs (bathing, care of mouth/teeth, toileting, grooming, dressing, etc.)  - Assess/evaluate cause of self-care deficits   - Assess range of motion  - Assess patient's mobility; develop plan if impaired  - Assess patient's need for assistive devices and provide as appropriate  - Encourage maximum independence but intervene and supervise when necessary  - Involve family in performance of ADLs  - Assess for home care needs following discharge   - Consider OT consult to assist with ADL evaluation and planning for discharge  - Provide patient education as appropriate  Outcome: Progressing  Goal: Maintains/Returns to pre admission functional level  Description: INTERVENTIONS:  - Perform AM-PAC 6 Click Basic Mobility/ Daily Activity assessment daily.  - Set and communicate daily mobility goal to care team and patient/family/caregiver.   - Collaborate with rehabilitation services on mobility goals if consulted  - Out of bed for toileting  - Record patient progress and toleration of activity level   Outcome: Progressing     Problem: DISCHARGE PLANNING  Goal: Discharge to home or other facility with appropriate resources  Description: INTERVENTIONS:  - Identify barriers to discharge w/patient and caregiver  - Arrange for needed discharge resources and transportation as appropriate  - Identify discharge learning needs (meds, wound care, etc.)  - Arrange for interpretive services to assist at discharge as needed  - Refer to Case Management Department for coordinating discharge planning if the patient needs post-hospital services based on physician/advanced practitioner order or complex needs  related to functional status, cognitive ability, or social support system  Outcome: Progressing     Problem: Knowledge Deficit  Goal: Patient/family/caregiver demonstrates understanding of disease process, treatment plan, medications, and discharge instructions  Description: Complete learning assessment and assess knowledge base.  Interventions:  - Provide teaching at level of understanding  - Provide teaching via preferred learning methods  Outcome: Progressing     Problem: RESPIRATORY - ADULT  Goal: Achieves optimal ventilation and oxygenation  Description: INTERVENTIONS:  - Assess for changes in respiratory status  - Assess for changes in mentation and behavior  - Position to facilitate oxygenation and minimize respiratory effort  - Oxygen administered by appropriate delivery if ordered  - Initiate smoking cessation education as indicated  - Encourage broncho-pulmonary hygiene including cough, deep breathe, Incentive Spirometry  - Assess the need for suctioning and aspirate as needed  - Assess and instruct to report SOB or any respiratory difficulty  - Respiratory Therapy support as indicated  Outcome: Progressing

## 2025-02-06 NOTE — ASSESSMENT & PLAN NOTE
Patient is a 79-year-old male with past medical history of obesity, tobacco use, hypertension, hyperlipidemia, diabetes, and chronic hypoxic respiratory failure who presents to the hospital with abdominal/chest/back pain and shortness of breath.  He reported that he had right sided sharp/stabbing abdominal pain that radiated to his right chest and his right shoulder blade.  With interventions in the emergency room this pain has improved.  He was given doxycycline and prednisone along with nebulizers and was going to be discharged from the emergency room however his ambulatory sat dropped into the 70s.  Patient reports that his ambulatory sat will regularly dropping into the mid 80s at home.  He has been using his rescue inhaler more frequently so he will be admitted to the hospital for further management.  Pulmonology consulted, appreciate the assistance.  He does not seem to have a current pulmonologist that he follows with.  He uses a rescue inhaler only, no scheduled ICS/LABA  Still smokes, 1 pack/day.  Cessation advised and a strategy for removing the habit provided.  Nicotine patch offered.  On 2 L nasal cannula his oxygen saturation stable, at rest, at 94%  Keep saturation above 88  Xopenex/Atrovent/Pulmicort nebs  Twice daily Solu-Medrol  Continue doxycycline  Viral panel negative

## 2025-02-06 NOTE — ASSESSMENT & PLAN NOTE
Hold metformin  Sliding scale insulin  ACHS glucose checks  Diabetic diet  Hypoglycemia protocol  Check hemoglobin A1c    Lab Results   Component Value Date    HGBA1C 6.1 (H) 07/06/2022       Recent Labs     02/05/25  2308 02/06/25  0705   POCGLU 180* 153*       Blood Sugar Average: Last 72 hrs:  (P) 166.5

## 2025-02-06 NOTE — ASSESSMENT & PLAN NOTE
Noted on CAT scan: 3.2 cm infrarenal abdominal aortic aneurysm.   Discussed with patient the importance of smoking cessation and how smoking increases possibility of rupture  Continue to monitor

## 2025-02-06 NOTE — PLAN OF CARE
Problem: PAIN - ADULT  Goal: Verbalizes/displays adequate comfort level or baseline comfort level  Description: Interventions:  - Encourage patient to monitor pain and request assistance  - Assess pain using appropriate pain scale  - Administer analgesics based on type and severity of pain and evaluate response  - Implement non-pharmacological measures as appropriate and evaluate response  - Consider cultural and social influences on pain and pain management  - Notify physician/advanced practitioner if interventions unsuccessful or patient reports new pain  Outcome: Progressing     Problem: INFECTION - ADULT  Goal: Absence or prevention of progression during hospitalization  Description: INTERVENTIONS:  - Assess and monitor for signs and symptoms of infection  - Monitor lab/diagnostic results  - Monitor all insertion sites, i.e. indwelling lines, tubes, and drains  - Monitor endotracheal if appropriate and nasal secretions for changes in amount and color  - [place] appropriate cooling/warming therapies per order  - Administer medications as ordered  - Instruct and encourage patient and family to use good hand hygiene technique  - Identify and instruct in appropriate isolation precautions for identified infection/condition  Outcome: Progressing  Goal: Absence of fever/infection during neutropenic period  Description: INTERVENTIONS:  - Monitor WBC    Outcome: Progressing     Problem: SAFETY ADULT  Goal: Patient will remain free of falls  Description: INTERVENTIONS:  - Educate patient/family on patient safety including physical limitations  - Instruct patient to call for assistance with activity   - Consult OT/PT to assist with strengthening/mobility   - Keep Call bell within reach  - Keep bed low and locked with side rails adjusted as appropriate  - Keep care items and personal belongings within reach  - Initiate and maintain comfort rounds  - Make Fall Risk Sign visible to staff  - Apply yellow socks and bracelet  for high fall risk patients  - Consider moving patient to room near nurses station  Outcome: Progressing  Goal: Maintain or return to baseline ADL function  Description: INTERVENTIONS:  -  Assess patient's ability to carry out ADLs; assess patient's baseline for ADL function and identify physical deficits which impact ability to perform ADLs (bathing, care of mouth/teeth, toileting, grooming, dressing, etc.)  - Assess/evaluate cause of self-care deficits   - Assess range of motion  - Assess patient's mobility; develop plan if impaired  - Assess patient's need for assistive devices and provide as appropriate  - Encourage maximum independence but intervene and supervise when necessary  - Involve family in performance of ADLs  - Assess for home care needs following discharge   - Consider OT consult to assist with ADL evaluation and planning for discharge  - Provide patient education as appropriate  Outcome: Progressing  Goal: Maintains/Returns to pre admission functional level  Description: INTERVENTIONS:  - Perform AM-PAC 6 Click Basic Mobility/ Daily Activity assessment daily.  - Set and communicate daily mobility goal to care team and patient/family/caregiver.   - Record patient progress and toleration of activity level   Outcome: Progressing     Problem: DISCHARGE PLANNING  Goal: Discharge to home or other facility with appropriate resources  Description: INTERVENTIONS:  - Identify barriers to discharge w/patient and caregiver  - Arrange for needed discharge resources and transportation as appropriate  - Identify discharge learning needs (meds, wound care, etc.)  - Arrange for interpretive services to assist at discharge as needed  - Refer to Case Management Department for coordinating discharge planning if the patient needs post-hospital services based on physician/advanced practitioner order or complex needs related to functional status, cognitive ability, or social support system  Outcome: Progressing      Problem: Knowledge Deficit  Goal: Patient/family/caregiver demonstrates understanding of disease process, treatment plan, medications, and discharge instructions  Description: Complete learning assessment and assess knowledge base.  Interventions:  - Provide teaching at level of understanding  - Provide teaching via preferred learning methods  Outcome: Progressing     Problem: RESPIRATORY - ADULT  Goal: Achieves optimal ventilation and oxygenation  Description: INTERVENTIONS:  - Assess for changes in respiratory status  - Assess for changes in mentation and behavior  - Position to facilitate oxygenation and minimize respiratory effort  - Oxygen administered by appropriate delivery if ordered  - Initiate smoking cessation education as indicated  - Encourage broncho-pulmonary hygiene including cough, deep breathe, Incentive Spirometry  - Assess the need for suctioning and aspirate as needed  - Assess and instruct to report SOB or any respiratory difficulty  - Respiratory Therapy support as indicated  Outcome: Progressing

## 2025-02-07 VITALS
HEIGHT: 66 IN | WEIGHT: 187 LBS | HEART RATE: 81 BPM | TEMPERATURE: 97.3 F | DIASTOLIC BLOOD PRESSURE: 81 MMHG | RESPIRATION RATE: 18 BRPM | SYSTOLIC BLOOD PRESSURE: 143 MMHG | BODY MASS INDEX: 30.05 KG/M2 | OXYGEN SATURATION: 96 %

## 2025-02-07 PROBLEM — J96.11 CHRONIC RESPIRATORY FAILURE WITH HYPOXIA (HCC): Status: ACTIVE | Noted: 2025-02-07

## 2025-02-07 PROBLEM — F17.210 CIGARETTE NICOTINE DEPENDENCE WITHOUT COMPLICATION: Status: ACTIVE | Noted: 2025-02-07

## 2025-02-07 LAB
ANION GAP SERPL CALCULATED.3IONS-SCNC: 6 MMOL/L (ref 4–13)
BASOPHILS # BLD AUTO: 0.02 THOUSANDS/ΜL (ref 0–0.1)
BASOPHILS NFR BLD AUTO: 0 % (ref 0–1)
BUN SERPL-MCNC: 24 MG/DL (ref 5–25)
CALCIUM SERPL-MCNC: 9.1 MG/DL (ref 8.4–10.2)
CHLORIDE SERPL-SCNC: 101 MMOL/L (ref 96–108)
CO2 SERPL-SCNC: 30 MMOL/L (ref 21–32)
CREAT SERPL-MCNC: 0.97 MG/DL (ref 0.6–1.3)
EOSINOPHIL # BLD AUTO: 0 THOUSAND/ΜL (ref 0–0.61)
EOSINOPHIL NFR BLD AUTO: 0 % (ref 0–6)
ERYTHROCYTE [DISTWIDTH] IN BLOOD BY AUTOMATED COUNT: 13 % (ref 11.6–15.1)
GFR SERPL CREATININE-BSD FRML MDRD: 74 ML/MIN/1.73SQ M
GLUCOSE SERPL-MCNC: 100 MG/DL (ref 65–140)
GLUCOSE SERPL-MCNC: 109 MG/DL (ref 65–140)
GLUCOSE SERPL-MCNC: 118 MG/DL (ref 65–140)
HCT VFR BLD AUTO: 40.6 % (ref 36.5–49.3)
HGB BLD-MCNC: 12.5 G/DL (ref 12–17)
IMM GRANULOCYTES # BLD AUTO: 0.09 THOUSAND/UL (ref 0–0.2)
IMM GRANULOCYTES NFR BLD AUTO: 1 % (ref 0–2)
LYMPHOCYTES # BLD AUTO: 1.07 THOUSANDS/ΜL (ref 0.6–4.47)
LYMPHOCYTES NFR BLD AUTO: 7 % (ref 14–44)
MCH RBC QN AUTO: 28.8 PG (ref 26.8–34.3)
MCHC RBC AUTO-ENTMCNC: 30.8 G/DL (ref 31.4–37.4)
MCV RBC AUTO: 94 FL (ref 82–98)
MONOCYTES # BLD AUTO: 1.21 THOUSAND/ΜL (ref 0.17–1.22)
MONOCYTES NFR BLD AUTO: 8 % (ref 4–12)
NEUTROPHILS # BLD AUTO: 13 THOUSANDS/ΜL (ref 1.85–7.62)
NEUTS SEG NFR BLD AUTO: 84 % (ref 43–75)
NRBC BLD AUTO-RTO: 0 /100 WBCS
PLATELET # BLD AUTO: 227 THOUSANDS/UL (ref 149–390)
PMV BLD AUTO: 9.9 FL (ref 8.9–12.7)
POTASSIUM SERPL-SCNC: 4.3 MMOL/L (ref 3.5–5.3)
RBC # BLD AUTO: 4.34 MILLION/UL (ref 3.88–5.62)
SODIUM SERPL-SCNC: 137 MMOL/L (ref 135–147)
WBC # BLD AUTO: 15.39 THOUSAND/UL (ref 4.31–10.16)

## 2025-02-07 PROCEDURE — 85025 COMPLETE CBC W/AUTO DIFF WBC: CPT | Performed by: STUDENT IN AN ORGANIZED HEALTH CARE EDUCATION/TRAINING PROGRAM

## 2025-02-07 PROCEDURE — 82948 REAGENT STRIP/BLOOD GLUCOSE: CPT

## 2025-02-07 PROCEDURE — 99239 HOSP IP/OBS DSCHRG MGMT >30: CPT | Performed by: STUDENT IN AN ORGANIZED HEALTH CARE EDUCATION/TRAINING PROGRAM

## 2025-02-07 PROCEDURE — 99232 SBSQ HOSP IP/OBS MODERATE 35: CPT | Performed by: INTERNAL MEDICINE

## 2025-02-07 PROCEDURE — 80048 BASIC METABOLIC PNL TOTAL CA: CPT | Performed by: STUDENT IN AN ORGANIZED HEALTH CARE EDUCATION/TRAINING PROGRAM

## 2025-02-07 PROCEDURE — 94760 N-INVAS EAR/PLS OXIMETRY 1: CPT

## 2025-02-07 PROCEDURE — 94640 AIRWAY INHALATION TREATMENT: CPT

## 2025-02-07 RX ORDER — ALBUTEROL SULFATE 90 UG/1
2 INHALANT RESPIRATORY (INHALATION) EVERY 6 HOURS PRN
Qty: 18 G | Refills: 0 | Status: SHIPPED | OUTPATIENT
Start: 2025-02-07

## 2025-02-07 RX ORDER — DOXYCYCLINE 100 MG/1
100 CAPSULE ORAL EVERY 12 HOURS
Qty: 10 CAPSULE | Refills: 0 | Status: SHIPPED | OUTPATIENT
Start: 2025-02-07 | End: 2025-02-12

## 2025-02-07 RX ORDER — NICOTINE 21 MG/24HR
1 PATCH, TRANSDERMAL 24 HOURS TRANSDERMAL DAILY
Qty: 28 PATCH | Refills: 0 | Status: SHIPPED | OUTPATIENT
Start: 2025-02-07

## 2025-02-07 RX ORDER — UMECLIDINIUM BROMIDE AND VILANTEROL TRIFENATATE 62.5; 25 UG/1; UG/1
1 POWDER RESPIRATORY (INHALATION) DAILY
Qty: 60 BLISTER | Refills: 0 | Status: SHIPPED | OUTPATIENT
Start: 2025-02-07 | End: 2025-03-09

## 2025-02-07 RX ADMIN — LEVALBUTEROL HYDROCHLORIDE 1.25 MG: 1.25 SOLUTION RESPIRATORY (INHALATION) at 07:15

## 2025-02-07 RX ADMIN — ACETAMINOPHEN 325MG 650 MG: 325 TABLET ORAL at 08:34

## 2025-02-07 RX ADMIN — PRAVASTATIN SODIUM 40 MG: 40 TABLET ORAL at 08:31

## 2025-02-07 RX ADMIN — AMLODIPINE BESYLATE 5 MG: 5 TABLET ORAL at 08:31

## 2025-02-07 RX ADMIN — TAMSULOSIN HYDROCHLORIDE 0.4 MG: 0.4 CAPSULE ORAL at 08:31

## 2025-02-07 RX ADMIN — IPRATROPIUM BROMIDE 0.5 MG: 0.5 SOLUTION RESPIRATORY (INHALATION) at 07:15

## 2025-02-07 RX ADMIN — HEPARIN SODIUM 5000 UNITS: 5000 INJECTION INTRAVENOUS; SUBCUTANEOUS at 05:28

## 2025-02-07 RX ADMIN — GABAPENTIN 100 MG: 100 CAPSULE ORAL at 08:31

## 2025-02-07 RX ADMIN — ASPIRIN 81 MG: 81 TABLET, CHEWABLE ORAL at 08:31

## 2025-02-07 RX ADMIN — BUDESONIDE INHALATION 0.5 MG: 0.5 SUSPENSION RESPIRATORY (INHALATION) at 07:15

## 2025-02-07 RX ADMIN — DOXYCYCLINE 100 MG: 100 CAPSULE ORAL at 08:31

## 2025-02-07 NOTE — ASSESSMENT & PLAN NOTE
Continue home medication regimen on discharge    Lab Results   Component Value Date    HGBA1C 6.2 (H) 02/06/2025       Recent Labs     02/06/25  1543 02/06/25 2052 02/07/25  0733 02/07/25  1042   POCGLU 177* 159* 109 100       Blood Sugar Average: Last 72 hrs:  (P) [id number]

## 2025-02-07 NOTE — UTILIZATION REVIEW
NOTIFICATION OF INPATIENT ADMISSION   AUTHORIZATION REQUEST   SERVICING FACILITY:   [place] [address]  Tax ID: [id number]  NPI: [id number] ATTENDING PROVIDER:  Attending Name and NPI#: Sera Bloom Md [[id number]]  Address: [address] [place] [address]  Phone: [phone]   ADMISSION INFORMATION:  Place of Service: Inpatient Acute [facility]  Place of Service Code: 21  Inpatient Admission Date/Time: 2/6/25  2:45 PM  Discharge Date/Time: 2/7/2025  1:54 PM  Admitting Diagnosis Code/Description:  Tobacco use disorder [F17.200]  Epigastric pain [R10.13]  Abdominal pain [R10.9]  Aortic aneurysm (HCC) [I71.9]  Pulmonary nodule [R91.1]  COPD with acute exacerbation (HCC) [J44.1]     UTILIZATION REVIEW CONTACT:  Jennifer Schneider, Utilization   Network Utilization Review Department  Phone: [phone]  Fax [phone]  Email: [email or web address]  Contact for approvals/pending authorizations, clinical reviews, and discharge.     PHYSICIAN ADVISORY SERVICES:  Medical Necessity Denial & Qkgw-fu-Flpp Review  Phone: [phone]  Fax: [phone]  Email: [email or web address]     DISCHARGE SUPPORT TEAM:  For Patients Discharge Needs & Updates  Phone: [phone] opt. 2 Fax: [phone]  Email: [email or web address]

## 2025-02-07 NOTE — PROGRESS NOTES
Progress Note - Pulmonology   Name: Ricki Shine 79 y.o. male I MRN: [id number]  Unit/Bed#: -01 I Date of Admission: 2/5/2025   Date of Service: 2/7/2025 I Hospital Day: 1    Assessment & Plan  COPD with acute exacerbation (HCC)  Continue to monitor off systemic steroids  Continue Xopenex/Atrovent nebs 3 times daily while inpatient  At discharge recommend patient go home with an oral Ellipta 1 puff daily and albuterol HFA 2 puffs every 6 hours as needed  Would benefit from outpatient pulmonary follow-up and PFTs in the future  Chronic respiratory failure with hypoxia (HCC)  Seen on 2 L nasal cannula.  Patient reports his oxygen requirement at baseline is 2 L nasal cannula.  Maintain saturations greater than 88%  Cigarette nicotine dependence without complication  Smoking cessation encouraged  Continue NRT while inpatient    24 Hour Events : none reported  Subjective : Patient was seen and examined today.  No overnight events reported.  Patient states that he is feeling enough to go home.  No acute complaints.    Objective :  Temp:  [97.3 °F (36.3 °C)-97.5 °F (36.4 °C)] 97.3 °F (36.3 °C)  HR:  [81-98] 81  BP: (124-143)/(64-81) 143/81  Resp:  [18] 18  SpO2:  [90 %-96 %] 96 %  O2 Device: Nasal cannula  Nasal Cannula O2 Flow Rate (L/min):  [2 L/min] 2 L/min    Physical Exam  Vitals reviewed.   Constitutional:       Appearance: Normal appearance. He is well-developed.   HENT:      Head: Normocephalic and atraumatic.      Nose: Nose normal.      Mouth/Throat:      Mouth: Mucous membranes are moist.   Eyes:      Extraocular Movements: Extraocular movements intact.   Cardiovascular:      Rate and Rhythm: Normal rate and regular rhythm.      Heart sounds: Normal heart sounds.   Pulmonary:      Effort: Pulmonary effort is normal. No respiratory distress.      Breath sounds: No wheezing, rhonchi or rales.      Comments: Diminished breath sounds  Musculoskeletal:         General: No swelling.   Skin:     General: Skin  is warm and dry.   Neurological:      Mental Status: He is alert. Mental status is at baseline.   Psychiatric:         Mood and Affect: Mood normal.         Behavior: Behavior normal.           Lab Results: I have reviewed the following results:   .     02/07/25  0456   WBC 15.39*   HGB 12.5   HCT 40.6      SODIUM 137   K 4.3      CO2 30   BUN 24   CREATININE 0.97   GLUC 118     ABG: No new results in last 24 hours.

## 2025-02-07 NOTE — PLAN OF CARE
Problem: PAIN - ADULT  Goal: Verbalizes/displays adequate comfort level or baseline comfort level  Description: Interventions:  - Encourage patient to monitor pain and request assistance  - Assess pain using appropriate pain scale  - Administer analgesics based on type and severity of pain and evaluate response  - Implement non-pharmacological measures as appropriate and evaluate response  - Consider cultural and social influences on pain and pain management  - Notify physician/advanced practitioner if interventions unsuccessful or patient reports new pain  Outcome: Progressing     Problem: INFECTION - ADULT  Goal: Absence or prevention of progression during hospitalization  Description: INTERVENTIONS:  - Assess and monitor for signs and symptoms of infection  - Monitor lab/diagnostic results  - Monitor all insertion sites, i.e. indwelling lines, tubes, and drains  - Monitor endotracheal if appropriate and nasal secretions for changes in amount and color  - [place] appropriate cooling/warming therapies per order  - Administer medications as ordered  - Instruct and encourage patient and family to use good hand hygiene technique  - Identify and instruct in appropriate isolation precautions for identified infection/condition  Outcome: Progressing  Goal: Absence of fever/infection during neutropenic period  Description: INTERVENTIONS:  - Monitor WBC    Outcome: Progressing     Problem: SAFETY ADULT  Goal: Patient will remain free of falls  Description: INTERVENTIONS:  - Educate patient/family on patient safety including physical limitations  - Instruct patient to call for assistance with activity   - Consult OT/PT to assist with strengthening/mobility   - Keep Call bell within reach  - Keep bed low and locked with side rails adjusted as appropriate  - Keep care items and personal belongings within reach  - Initiate and maintain comfort rounds  - Make Fall Risk Sign visible to staff  - Offer Toileting every 2 Hours,  in advance of need  - Obtain necessary fall risk management equipment: non-skid socks  - Apply yellow socks and bracelet for high fall risk patients  - Consider moving patient to room near nurses station  Outcome: Progressing  Goal: Maintain or return to baseline ADL function  Description: INTERVENTIONS:  -  Assess patient's ability to carry out ADLs; assess patient's baseline for ADL function and identify physical deficits which impact ability to perform ADLs (bathing, care of mouth/teeth, toileting, grooming, dressing, etc.)  - Assess/evaluate cause of self-care deficits   - Assess range of motion  - Assess patient's mobility; develop plan if impaired  - Assess patient's need for assistive devices and provide as appropriate  - Encourage maximum independence but intervene and supervise when necessary  - Involve family in performance of ADLs  - Assess for home care needs following discharge   - Consider OT consult to assist with ADL evaluation and planning for discharge  - Provide patient education as appropriate  Outcome: Progressing  Goal: Maintains/Returns to pre admission functional level  Description: INTERVENTIONS:  - Perform AM-PAC 6 Click Basic Mobility/ Daily Activity assessment daily.  - Set and communicate daily mobility goal to care team and patient/family/caregiver.   - Collaborate with rehabilitation services on mobility goals if consulted  - Perform Range of Motion 3 times a day.  - Reposition patient every 2 hours.  - Dangle patient 3 times a day  - Stand patient 3 times a day  - Ambulate patient 3 times a day  - Out of bed to chair 3 times a day   - Out of bed for meals 3 times a day  - Out of bed for toileting  - Record patient progress and toleration of activity level   Outcome: Progressing     Problem: DISCHARGE PLANNING  Goal: Discharge to home or other facility with appropriate resources  Description: INTERVENTIONS:  - Identify barriers to discharge w/patient and caregiver  - Arrange for needed  discharge resources and transportation as appropriate  - Identify discharge learning needs (meds, wound care, etc.)  - Arrange for interpretive services to assist at discharge as needed  - Refer to Case Management Department for coordinating discharge planning if the patient needs post-hospital services based on physician/advanced practitioner order or complex needs related to functional status, cognitive ability, or social support system  Outcome: Progressing     Problem: Knowledge Deficit  Goal: Patient/family/caregiver demonstrates understanding of disease process, treatment plan, medications, and discharge instructions  Description: Complete learning assessment and assess knowledge base.  Interventions:  - Provide teaching at level of understanding  - Provide teaching via preferred learning methods  Outcome: Progressing     Problem: RESPIRATORY - ADULT  Goal: Achieves optimal ventilation and oxygenation  Description: INTERVENTIONS:  - Assess for changes in respiratory status  - Assess for changes in mentation and behavior  - Position to facilitate oxygenation and minimize respiratory effort  - Oxygen administered by appropriate delivery if ordered  - Initiate smoking cessation education as indicated  - Encourage broncho-pulmonary hygiene including cough, deep breathe, Incentive Spirometry  - Assess the need for suctioning and aspirate as needed  - Assess and instruct to report SOB or any respiratory difficulty  - Respiratory Therapy support as indicated  Outcome: Progressing

## 2025-02-07 NOTE — INCIDENTAL FINDINGS
The following findings require follow up:  Radiographic finding   Finding: CTA dissection protocol chest/abdomen/pelvis: No acute aortic/arterial abnormality., Diffuse severe atherosclerotic disease as described above., 3.2 cm infrarenal abdominal aortic aneurysm., Recommend follow-up CT versus ultrasound in 3 years., Pleural-based 1 cm right lower lobe nodule measuring 1 cm, slightly increased from October 2023 prior., Recommend follow-up chest CT in 6 months to evaluate for interval change., No acute abdominopelvic abnormality    Follow up required: Yes   Follow up should be done within 6 month(s)    Please notify the following clinician to assist with the follow up:   Dr. Jadyn Donald, DO       Incidental finding results were discussed with the Patient by Sera Bloom MD on 02/07/25.   They expressed understanding and all questions answered.

## 2025-02-07 NOTE — ASSESSMENT & PLAN NOTE
Patient is a 79-year-old male with past medical history of obesity, tobacco use, hypertension, hyperlipidemia, diabetes, and chronic hypoxic respiratory failure who presents to the hospital with abdominal/chest/back pain and shortness of breath.  He reported that he had right sided sharp/stabbing abdominal pain that radiated to his right chest and his right shoulder blade.  With interventions in the emergency room this pain has improved.  He was given doxycycline and prednisone along with nebulizers and was going to be discharged from the emergency room however his ambulatory sat dropped into the 70s.  Patient reports that his ambulatory sat will regularly dropping into the mid 80s at home.  He has been using his rescue inhaler more frequently.   Pulmonology consulted.  Will need to follow-up with pulmonology in the outpatient setting.  Recommend starting Anoro on discharge.  Continue prednisone 20 mg x 7 days.  Started on albuterol MDI as needed and discharge  Still smokes, 1 pack/day.  Cessation advised and a strategy for removing the habit provided.  Nicotine patch ordered  On 2 L nasal cannula his oxygen saturation stable, at rest, at 94%  Keep saturation above 88  Patient received IV Solu-Medrol hospitalized, transition to prednisone 20 mg x 7 days based on pulmonology recommendations  Continue doxycycline x 5 days  Viral panel negative

## 2025-02-07 NOTE — ASSESSMENT & PLAN NOTE
Noted on CAT scan: 3.2 cm infrarenal abdominal aortic aneurysm.   Discussed with patient the importance of smoking cessation and how smoking increases possibility of rupture  Recommend repeat CT in 6 months.

## 2025-02-07 NOTE — ASSESSMENT & PLAN NOTE
Seen on 2 L nasal cannula.  Patient reports his oxygen requirement at baseline is 2 L nasal cannula.  Maintain saturations greater than 88%

## 2025-02-07 NOTE — DISCHARGE SUMMARY
Discharge Summary - Hospitalist   Name: Ricki Shine 79 y.o. male I MRN: [id number]  Unit/Bed#: -01 I Date of Admission: 2/5/2025   Date of Service: 2/7/2025 I Hospital Day: 1     Assessment & Plan  COPD with acute exacerbation (HCC)  Patient is a 79-year-old male with past medical history of obesity, tobacco use, hypertension, hyperlipidemia, diabetes, and chronic hypoxic respiratory failure who presents to the hospital with abdominal/chest/back pain and shortness of breath.  He reported that he had right sided sharp/stabbing abdominal pain that radiated to his right chest and his right shoulder blade.  With interventions in the emergency room this pain has improved.  He was given doxycycline and prednisone along with nebulizers and was going to be discharged from the emergency room however his ambulatory sat dropped into the 70s.  Patient reports that his ambulatory sat will regularly dropping into the mid 80s at home.  He has been using his rescue inhaler more frequently.   Pulmonology consulted.  Will need to follow-up with pulmonology in the outpatient setting.  Recommend starting Anoro on discharge.  Continue prednisone 20 mg x 7 days.  Started on albuterol MDI as needed and discharge  Still smokes, 1 pack/day.  Cessation advised and a strategy for removing the habit provided.  Nicotine patch ordered  On 2 L nasal cannula his oxygen saturation stable, at rest, at 94%  Keep saturation above 88  Patient received IV Solu-Medrol hospitalized, transition to prednisone 20 mg x 7 days based on pulmonology recommendations  Continue doxycycline x 5 days  Viral panel negative  Benign prostatic hyperplasia with nocturia  Continue Flomax  Primary hypertension  Blood pressure stable  Continue amlodipine 5 mg daily  Other hyperlipidemia  Continue pravastatin 40 mg daily  Type 2 diabetes mellitus with diabetic neuropathy, without long-term current use of insulin (HCC)  Continue home medication regimen on  discharge    Lab Results   Component Value Date    HGBA1C 6.2 (H) 02/06/2025       Recent Labs     02/06/25  1543 02/06/25  2052 02/07/25  0733 02/07/25  1042   POCGLU 177* 159* 109 100       Blood Sugar Average: Last 72 hrs:  (P) [id number]    Infrarenal abdominal aortic aneurysm (AAA) without rupture (HCC)  Noted on CAT scan: 3.2 cm infrarenal abdominal aortic aneurysm.   Discussed with patient the importance of smoking cessation and how smoking increases possibility of rupture  Recommend repeat CT in 6 months.     Medical Problems        MESSAGE TO PCP (Jadyn Donald DO) FOR FOLLOW UP:   Thank you for allowing us to participate in the care of your patient, Ricki Shine, who was hospitalized from 2/5/2025 through 2/7/2025 with the admitting diagnosis of COPD exacerbation.  Patient remained on baseline O2 requirements, was seen by pulmonology and recommended to start inhaler on discharge.  Patient needs to follow-up with pulmonology in the outpatient setting.    Medication Changes:  Started on Anoro    Outpatient testing recommended:  Follow-up with pulmonology    If you have any additional questions or would like to discuss further, please feel free to contact me.    Sera Bloom MD  [facility], Hospitalist  [phone]     Admission Date:   Admission Orders (From admission, onward)       Ordered        02/06/25 1445  INPATIENT ADMISSION  Once            02/05/25 2012  Place in Observation  Once                          Discharge Date: 02/07/25    Consultations During Hospital Stay:  Pulmonology    Procedures Performed:   None    Significant Findings / Test Results:   CTA dissection protocol chest/abdomen/pelvis   Final Result by Jose Antonio Mcdonald MD (02/05 1942)      No acute aortic/arterial abnormality.   Diffuse severe atherosclerotic disease as described above.      3.2 cm infrarenal abdominal aortic aneurysm.   Recommend follow-up CT versus ultrasound in 3 years.       Pleural-based 1 cm right lower lobe nodule measuring 1 cm, slightly increased from October 2023 prior.   Recommend follow-up chest CT in 6 months to evaluate for interval change.      No acute abdominopelvic abnormality.      The study was marked in EPIC for immediate notification.            Workstation performed: [id number]               Incidental Findings:   As above      Test Results Pending at Discharge (will require follow up):   None     Complications: None    Reason for Admission: Abdominal/back/chest pain    Hospital Course:   Ricki Shine is a 79 y.o. male patient who originally presented to the hospital on 2/5/2025 due to shortness of breath, with associated abdominal/chest/back pain.  On admission it was noted that patient was in COPD exacerbation, and was given doxycycline, prednisone, and nebulizer treatment with improvement in symptoms.  Patient was supposed to be discharged from the emergency room, however ambulatory sat dropped into the 70s, therefore patient was admitted to the hospital.  While hospitalized patient's condition improved, he remained on his O2 baseline 2 L nasal cannula, and maintain saturation with ambulation.  He does report that he does not follow with pulmonology in the outpatient setting, pulmonology was consulted.  They will be setting up an outpatient follow-up for the patient.  Patient was started on Anoro on discharge, and prednisone 20 mg send 7 days based on pulmonology recommendations.  Patient started on IV steroids and nebulizer treatment while hospitalized. Patient received 2 days of doxycycline therapy while hospitalized.  Recommended close outpatient pulmonology follow-up and pulmonary rehab referral was provided on discharge. Discussed with patient extensively on the importance of smoking cessation. Patient was medically stable  at the time of discharge.      The patient, initially admitted to the hospital as inpatient, was discharged earlier than expected  "given the following: Improvement in condition, return to baseline.  Please see above list of diagnoses and related plan for additional information.     Condition at Discharge: stable    Discharge Day Visit / Exam:   Subjective: Patient seen and examined at bedside.  No acute events overnight.  Patient reports that he feels improved compared admission.  He has no other acute complaints at this time.  Vitals: Blood Pressure: 143/81 (02/07/25 0735)  Pulse: 81 (02/07/25 0735)  Temperature: (!) 97.3 °F (36.3 °C) (02/07/25 0735)  Temp Source: Temporal (02/05/25 2123)  Respirations: 18 (02/07/25 0735)  Height: 5' 6\" (167.6 cm) (02/05/25 2123)  Weight - Scale: 84.8 kg (187 lb) (02/05/25 2123)  SpO2: 96 % (02/07/25 0735)  Physical Exam   Constitutional:       General: He is not in acute distress.     Appearance: He is obese. He is not toxic-appearing.   HENT:      Head: Normocephalic and atraumatic.   Eyes:      Extraocular Movements: Extraocular movements intact.      Pupils: Pupils are equal, round, and reactive to light.   Cardiovascular:      Rate and Rhythm: Normal rate and regular rhythm.   Pulmonary:      Effort: Pulmonary effort is normal.      Comments: On 2 L nasal cannula, mildly diminished breath sounds bilaterally  Abdominal:      Palpations: Abdomen is soft.   Musculoskeletal:         General: No swelling.      Right lower leg: No edema.      Left lower leg: No edema.   Skin:     General: Skin is warm.   Neurological:      General: No focal deficit present.      Mental Status: He is alert and oriented to person, place, and time. Mental status is at baseline.   Psychiatric:         Mood and Affect: Mood normal.         Behavior: Behavior normal.     Discussion with Family: Updated  (daughter) via phone.    Discharge instructions/Information to patient and family:   See after visit summary for information provided to patient and family.      Provisions for Follow-Up Care:  See after visit summary " for information related to follow-up care and any pertinent home health orders.      Mobility at time of Discharge:   Basic Mobility Inpatient Raw Score: 21  JH-HLM Goal: 6: Walk 10 steps or more  JH-HLM Achieved: 7: Walk 25 feet or more  HLM Goal achieved. Continue to encourage appropriate mobility.     Disposition:   Home    Planned Readmission: No    Discharge Medications:  See after visit summary for reconciled discharge medications provided to patient and/or family.      Administrative Statements       **Please Note: This note may have been constructed using a voice recognition system**

## 2025-02-07 NOTE — ASSESSMENT & PLAN NOTE
Continue to monitor off systemic steroids  Continue Xopenex/Atrovent nebs 3 times daily while inpatient  At discharge recommend patient go home with an oral Ellipta 1 puff daily and albuterol HFA 2 puffs every 6 hours as needed  Would benefit from outpatient pulmonary follow-up and PFTs in the future

## 2025-03-27 ENCOUNTER — HOSPITAL ENCOUNTER (OUTPATIENT)
Facility: CLINIC | Age: 80
Discharge: HOME | End: 2025-03-27
Payer: COMMERCIAL

## 2025-03-27 ENCOUNTER — OFFICE VISIT (OUTPATIENT)
Dept: PULMONOLOGY | Facility: CLINIC | Age: 80
End: 2025-03-27
Payer: COMMERCIAL

## 2025-03-27 VITALS
SYSTOLIC BLOOD PRESSURE: 120 MMHG | OXYGEN SATURATION: 90 % | HEART RATE: 102 BPM | BODY MASS INDEX: 28.93 KG/M2 | HEIGHT: 66 IN | WEIGHT: 180 LBS | DIASTOLIC BLOOD PRESSURE: 62 MMHG | TEMPERATURE: 98.8 F

## 2025-03-27 DIAGNOSIS — R60.9 EDEMA, UNSPECIFIED: ICD-10-CM

## 2025-03-27 DIAGNOSIS — M79.604 PAIN IN RIGHT LEG: ICD-10-CM

## 2025-03-27 DIAGNOSIS — J96.11 CHRONIC RESPIRATORY FAILURE WITH HYPOXIA (HCC): ICD-10-CM

## 2025-03-27 DIAGNOSIS — M79.605 PAIN IN LEFT LEG: ICD-10-CM

## 2025-03-27 DIAGNOSIS — J44.1 COPD WITH ACUTE EXACERBATION (HCC): ICD-10-CM

## 2025-03-27 DIAGNOSIS — J44.9 CHRONIC OBSTRUCTIVE PULMONARY DISEASE, UNSPECIFIED COPD TYPE (HCC): Primary | ICD-10-CM

## 2025-03-27 DIAGNOSIS — R91.1 PULMONARY NODULE: ICD-10-CM

## 2025-03-27 PROCEDURE — 93925 LOWER EXTREMITY STUDY: CPT

## 2025-03-27 PROCEDURE — G2211 COMPLEX E/M VISIT ADD ON: HCPCS | Performed by: NURSE PRACTITIONER

## 2025-03-27 PROCEDURE — 93970 EXTREMITY STUDY: CPT

## 2025-03-27 PROCEDURE — 93970 EXTREMITY STUDY: CPT | Performed by: SURGERY

## 2025-03-27 PROCEDURE — 99214 OFFICE O/P EST MOD 30 MIN: CPT | Performed by: NURSE PRACTITIONER

## 2025-03-27 PROCEDURE — 93925 LOWER EXTREMITY STUDY: CPT | Performed by: SURGERY

## 2025-03-27 PROCEDURE — 93922 UPR/L XTREMITY ART 2 LEVELS: CPT | Performed by: SURGERY

## 2025-03-27 PROCEDURE — 93923 UPR/LXTR ART STDY 3+ LVLS: CPT

## 2025-03-27 RX ORDER — UMECLIDINIUM BROMIDE AND VILANTEROL TRIFENATATE 62.5; 25 UG/1; UG/1
1 POWDER RESPIRATORY (INHALATION) DAILY
Qty: 60 BLISTER | Refills: 5 | Status: SHIPPED | OUTPATIENT
Start: 2025-03-27 | End: 2025-04-26

## 2025-03-27 NOTE — ASSESSMENT & PLAN NOTE
RLL pleural based nodule measuring 1cm on recent CT scan; slightly increased from October 2023.    Recommend repeat 6mo CT    Orders:    CT lung nodule follow-up; Future

## 2025-03-27 NOTE — PROGRESS NOTES
Follow-up  Visit - Pulmonary Medicine   Name: Ricki Shine      : 1945      MRN: [id number]  Encounter Provider: MARINO Garrido  Encounter Date: 3/27/2025   Encounter department: [facility]  :  Assessment & Plan  Pulmonary nodule  RLL pleural based nodule measuring 1cm on recent CT scan; slightly increased from 2023.    Recommend repeat 6mo CT    Orders:    CT lung nodule follow-up; Future    Chronic obstructive pulmonary disease, unspecified COPD type (HCC)  Very severe obstruction on  PFT with FEV1 52%; no reversibility post-bronchodilator. He has improved following recent exacerbation and feels he is currently back at his baseline.  Continue Anoro Ellipta 1 inhalation daily  Albuterol HFA up to 4x per day if needed for wheeze, cough, SOB  DuoNeb up to 4x per day if needed via nebulizer for wheeze, cough, SOB  I have recommended pulmonary rehab however he has no access to transportation.   He declined flu and Prevnar 20 in October.         Chronic respiratory failure with hypoxia (HCC)  He has had supplemental O2 for about 2 years now at 2L/m with exertion.  Continue 2L/m with exertion. When he arrived today on room air he had hypoxia at 86% Spo2.  I have encouraged him to wear O2 with ambulation as directed.         COPD with acute exacerbation (HCC)  No current exacerbation.  Orders:    Ambulatory Referral to Pulmonary Rehabilitation    umeclidinium-vilanterol (Anoro Ellipta) 62.5-25 mcg/actuation inhaler; Inhale 1 puff daily      Return in about 3 months (around 2025).    History of Present Illness   Ricki Shine is a 79 y.o. male who presents for follow up. He was recently hospitalized 25-25 after presenting to the ER with back pain and shortness of breath. Ambulatory oxygen saturations dropped in the ER to the 70's and he was admitted and treated for COPD exacerbation. He was discharged home on Anoro daily, prednisone x 7  days, and 2L/m which is his baseline.    He feels back to normal now. Shortness of breath and cough are at his baseline and he denies wheezing. He does feel Anoro is helpful. He has completed all doses of steroid. He was offered pulmonary rehab however he cannot attend due to lack of transportation.    Review of Systems  Please note that a 14-point review of systems was performed to include Constitutional, HEENT, Respiratory, CVS, GI, , Musculoskeletal, Integumentary, Neurologic, Rheumatologic, Endocrinologic, Psychiatric, Lymphatic, and Hematologic/Oncologic systems were reviewed and are negative unless otherwise stated in HPI. Positive and negative findings pertinent to this evaluation are incorporated into the history of present illness.       Current Outpatient Medications on File Prior to Visit   Medication Sig Dispense Refill    albuterol (Ventolin HFA) 90 mcg/act inhaler Inhale 2 puffs every 6 (six) hours as needed for wheezing or shortness of breath 18 g 0    amLODIPine (NORVASC) 5 mg tablet Take 5 mg by mouth daily      aspirin 81 mg chewable tablet Chew 81 mg daily      ergocalciferol (VITAMIN D2) 50,000 units TAKE ONE TABLET BY MOUTH ONE TIME A MONTH      gabapentin (NEURONTIN) 100 mg capsule Take 100 mg by mouth 2 (two) times a day      ipratropium-albuterol (DUO-NEB) 0.5-2.5 mg/3 mL nebulizer solution Take 3 mL by nebulization every 6 (six) hours as needed for wheezing or shortness of breath 360 mL 0    Lancets (OneTouch Delica Plus [id number]) MISC USE 1 LANCET TO CHECK GLUCOSE TWICE DAILY      melatonin 3 mg Take 2 tablets (6 mg total) by mouth daily at bedtime 60 tablet 0    metFORMIN (GLUCOPHAGE) 500 mg tablet Take 500 mg by mouth daily      OneTouch Ultra test strip 2 (two) times a day Test blood sugar      pravastatin (PRAVACHOL) 40 mg tablet Take 40 mg by mouth daily      tamsulosin (FLOMAX) 0.4 mg Take 0.4 mg by mouth daily      [DISCONTINUED] umeclidinium-vilanterol (Anoro Ellipta) 62.5-25  "mcg/actuation inhaler Inhale 1 puff daily 60 blister 0    nicotine (NICODERM CQ) 14 mg/24hr TD 24 hr patch Place 1 patch on the skin over 24 hours daily (Patient not taking: Reported on 3/27/2025) 28 patch 0     No current facility-administered medications on file prior to visit.      Social History     Tobacco Use    Smoking status: Former     Current packs/day: 1.00     Types: Cigarettes    Smokeless tobacco: Never   Vaping Use    Vaping status: Never Used   Substance and Sexual Activity    Alcohol use: Yes     Comment: Socially    Drug use: Never    Sexual activity: Not on file        Medical History Reviewed by provider this encounter:  Tobacco  Allergies  Meds  Problems  Med Hx  Surg Hx  Fam Hx     .    Objective   /62 (BP Location: Left arm, Patient Position: Sitting, Cuff Size: Standard)   Pulse 102   Temp 98.8 °F (37.1 °C) (Temporal)   Ht 5' 6\" (1.676 m)   Wt 81.6 kg (180 lb)   SpO2 90% Comment: 2 liters  BMI 29.05 kg/m²     Physical Exam  Vitals reviewed.   Constitutional:       Appearance: Normal appearance.   HENT:      Head: Normocephalic.      Nose: Nose normal.      Mouth/Throat:      Mouth: Mucous membranes are moist.      Pharynx: Oropharynx is clear.   Cardiovascular:      Rate and Rhythm: Normal rate and regular rhythm.      Pulses: Normal pulses.      Heart sounds: Normal heart sounds.   Pulmonary:      Effort: Pulmonary effort is normal.      Breath sounds: Normal breath sounds.   Musculoskeletal:         General: Normal range of motion.   Skin:     General: Skin is warm.      Capillary Refill: Capillary refill takes less than 2 seconds.   Neurological:      General: No focal deficit present.      Mental Status: He is alert and oriented to person, place, and time. Mental status is at baseline.   Psychiatric:         Mood and Affect: Mood normal.         Behavior: Behavior normal.           Diagnostic Data:  Labs: I personally reviewed the most recent laboratory data pertinent " "to today's visit.  Lab Results   Component Value Date    WBC 15.39 (H) 02/07/2025    HGB 12.5 02/07/2025    HCT 40.6 02/07/2025    MCV 94 02/07/2025     02/07/2025    EOSPCT 0 02/07/2025    EOSABS 0.00 02/07/2025    SEGSPCT 94 (H) 02/06/2025    MONOPCT 8 02/07/2025    MONOABS 0.12 02/06/2025     Lab Results   Component Value Date    CALCIUM 9.1 02/07/2025    K 4.3 02/07/2025    CO2 30 02/07/2025     02/07/2025    BUN 24 02/07/2025    CREATININE 0.97 02/07/2025     No results found for: \"IGE\", \"RAST\"  Lab Results   Component Value Date    ALT 6 (L) 02/06/2025    AST 10 (L) 02/06/2025    ALKPHOS 68 02/06/2025         Radiology results:  Radiology Results Review: I have reviewed radiology reports from PACS including: CT chest.  CTA dissection protocol chest/abd/pelvis 2/5/25  No acute aortic/arterial abnormality.  Diffuse severe atherosclerotic disease as described above.     3.2 cm infrarenal abdominal aortic aneurysm.  Recommend follow-up CT versus ultrasound in 3 years.     Pleural-based 1 cm right lower lobe nodule measuring 1 cm, slightly increased from October 2023 prior.  Recommend follow-up chest CT in 6 months to evaluate for interval change.     No acute abdominopelvic abnormality.    PFT  04/27/2021 5:17 PM EDT - via Care Everywhere  This result has an attachment that is not available.    Results:  FEV1 is moderate-severely decreased at 1.42 L - 52% predicted  FVC is normal  FEV1/FVC ratio is decreased at 45%  There is no significant bronchodilator response    Lung volumes are normal with % predicted and mild air trapping RV  131% predicted    Diffusing capacity is decreased at 41% predicted    Impression:  The patient has evidence of very severe obstruction , No significant  bronchodilator response, normal volumes and decreased diffusion      AiMARINO Armijo      "

## 2025-03-27 NOTE — ASSESSMENT & PLAN NOTE
He has had supplemental O2 for about 2 years now at 2L/m with exertion.  Continue 2L/m with exertion. When he arrived today on room air he had hypoxia at 86% Spo2.  I have encouraged him to wear O2 with ambulation as directed.

## 2025-03-27 NOTE — ASSESSMENT & PLAN NOTE
Very severe obstruction on 2021 PFT with FEV1 52%; no reversibility post-bronchodilator. He has improved following recent exacerbation and feels he is currently back at his baseline.  Continue Anoro Ellipta 1 inhalation daily  Albuterol HFA up to 4x per day if needed for wheeze, cough, SOB  DuoNeb up to 4x per day if needed via nebulizer for wheeze, cough, SOB  I have recommended pulmonary rehab however he has no access to transportation.   He declined flu and Prevnar 20 in October.